# Patient Record
Sex: FEMALE | Race: WHITE | NOT HISPANIC OR LATINO | Employment: FULL TIME | URBAN - METROPOLITAN AREA
[De-identification: names, ages, dates, MRNs, and addresses within clinical notes are randomized per-mention and may not be internally consistent; named-entity substitution may affect disease eponyms.]

---

## 2024-05-15 PROBLEM — D69.6 THROMBOCYTOPENIA (HCC): Status: ACTIVE | Noted: 2024-05-15

## 2024-05-15 PROBLEM — Z71.6 ENCOUNTER FOR SMOKING CESSATION COUNSELING: Status: ACTIVE | Noted: 2024-05-15

## 2024-05-15 PROBLEM — N39.0 UTI (URINARY TRACT INFECTION): Status: ACTIVE | Noted: 2024-05-15

## 2024-05-15 PROBLEM — F10.90 ALCOHOL USE DISORDER: Status: ACTIVE | Noted: 2024-05-15

## 2024-05-15 PROBLEM — F15.90 AMPHETAMINE MISUSE: Status: ACTIVE | Noted: 2024-05-15

## 2024-05-15 PROBLEM — F15.10 METHAMPHETAMINE ABUSE (HCC): Status: ACTIVE | Noted: 2024-05-15

## 2024-05-15 PROBLEM — E83.42 HYPOMAGNESEMIA: Status: ACTIVE | Noted: 2024-05-15

## 2024-05-15 PROBLEM — R17 ELEVATED BILIRUBIN: Status: ACTIVE | Noted: 2024-05-15

## 2024-05-15 PROBLEM — I67.841: Status: ACTIVE | Noted: 2024-05-15

## 2024-05-15 PROBLEM — N30.00 ACUTE CYSTITIS WITHOUT HEMATURIA: Status: ACTIVE | Noted: 2024-05-15

## 2024-05-15 PROBLEM — F15.929 METHAMPHETAMINE INTOXICATION (HCC): Status: ACTIVE | Noted: 2024-05-15

## 2024-05-15 PROBLEM — G45.9 TIA (TRANSIENT ISCHEMIC ATTACK): Status: ACTIVE | Noted: 2024-05-15

## 2024-05-15 PROBLEM — I21.4 NSTEMI (NON-ST ELEVATED MYOCARDIAL INFARCTION) (HCC): Status: ACTIVE | Noted: 2024-05-15

## 2024-05-15 PROBLEM — I24.89 DEMAND ISCHEMIA (HCC): Status: ACTIVE | Noted: 2024-05-15

## 2024-05-16 ENCOUNTER — HOSPITAL ENCOUNTER (INPATIENT)
Facility: MEDICAL CENTER | Age: 49
LOS: 1 days | DRG: 281 | End: 2024-05-17
Attending: HOSPITALIST | Admitting: STUDENT IN AN ORGANIZED HEALTH CARE EDUCATION/TRAINING PROGRAM

## 2024-05-16 DIAGNOSIS — I25.118 CORONARY ARTERY DISEASE OF NATIVE HEART WITH STABLE ANGINA PECTORIS, UNSPECIFIED VESSEL OR LESION TYPE (HCC): ICD-10-CM

## 2024-05-16 PROBLEM — Z72.0 TOBACCO ABUSE: Status: ACTIVE | Noted: 2024-05-16

## 2024-05-16 PROBLEM — R07.9 CHEST PAIN: Status: ACTIVE | Noted: 2024-05-16

## 2024-05-16 LAB
APTT PPP: 66.7 SEC (ref 24.7–36)
INR PPP: 1.14 (ref 0.87–1.13)
PROTHROMBIN TIME: 14.8 SEC (ref 12–14.6)
TROPONIN T SERPL-MCNC: 18 NG/L (ref 6–19)
UFH PPP CHRO-ACNC: <0.1 IU/ML

## 2024-05-16 PROCEDURE — 700102 HCHG RX REV CODE 250 W/ 637 OVERRIDE(OP): Performed by: STUDENT IN AN ORGANIZED HEALTH CARE EDUCATION/TRAINING PROGRAM

## 2024-05-16 PROCEDURE — 85520 HEPARIN ASSAY: CPT

## 2024-05-16 PROCEDURE — 99406 BEHAV CHNG SMOKING 3-10 MIN: CPT | Performed by: STUDENT IN AN ORGANIZED HEALTH CARE EDUCATION/TRAINING PROGRAM

## 2024-05-16 PROCEDURE — 700111 HCHG RX REV CODE 636 W/ 250 OVERRIDE (IP): Performed by: STUDENT IN AN ORGANIZED HEALTH CARE EDUCATION/TRAINING PROGRAM

## 2024-05-16 PROCEDURE — A9270 NON-COVERED ITEM OR SERVICE: HCPCS | Performed by: STUDENT IN AN ORGANIZED HEALTH CARE EDUCATION/TRAINING PROGRAM

## 2024-05-16 PROCEDURE — 84484 ASSAY OF TROPONIN QUANT: CPT | Mod: 91

## 2024-05-16 PROCEDURE — 85730 THROMBOPLASTIN TIME PARTIAL: CPT | Mod: 91

## 2024-05-16 PROCEDURE — 99223 1ST HOSP IP/OBS HIGH 75: CPT | Mod: 25 | Performed by: STUDENT IN AN ORGANIZED HEALTH CARE EDUCATION/TRAINING PROGRAM

## 2024-05-16 PROCEDURE — 36415 COLL VENOUS BLD VENIPUNCTURE: CPT

## 2024-05-16 PROCEDURE — 85610 PROTHROMBIN TIME: CPT

## 2024-05-16 PROCEDURE — 770020 HCHG ROOM/CARE - TELE (206)

## 2024-05-16 RX ORDER — ACETAMINOPHEN 325 MG/1
650 TABLET ORAL EVERY 6 HOURS PRN
Status: DISCONTINUED | OUTPATIENT
Start: 2024-05-16 | End: 2024-05-17 | Stop reason: HOSPADM

## 2024-05-16 RX ORDER — BUPROPION HYDROCHLORIDE 150 MG/1
150 TABLET, EXTENDED RELEASE ORAL DAILY
Status: DISCONTINUED | OUTPATIENT
Start: 2024-05-17 | End: 2024-05-17 | Stop reason: HOSPADM

## 2024-05-16 RX ORDER — LORAZEPAM 0.5 MG/1
0.5 TABLET ORAL EVERY 4 HOURS PRN
Status: DISCONTINUED | OUTPATIENT
Start: 2024-05-16 | End: 2024-05-17

## 2024-05-16 RX ORDER — HEPARIN SODIUM 1000 [USP'U]/ML
2000 INJECTION, SOLUTION INTRAVENOUS; SUBCUTANEOUS PRN
Status: DISCONTINUED | OUTPATIENT
Start: 2024-05-16 | End: 2024-05-17 | Stop reason: HOSPADM

## 2024-05-16 RX ORDER — LORAZEPAM 2 MG/1
2 TABLET ORAL
Status: DISCONTINUED | OUTPATIENT
Start: 2024-05-16 | End: 2024-05-17

## 2024-05-16 RX ORDER — ASPIRIN 81 MG/1
81 TABLET, CHEWABLE ORAL DAILY
Status: DISCONTINUED | OUTPATIENT
Start: 2024-05-17 | End: 2024-05-17 | Stop reason: HOSPADM

## 2024-05-16 RX ORDER — BUPROPION HYDROCHLORIDE 150 MG/1
150 TABLET, EXTENDED RELEASE ORAL 2 TIMES DAILY
Status: DISCONTINUED | OUTPATIENT
Start: 2024-05-27 | End: 2024-05-17 | Stop reason: HOSPADM

## 2024-05-16 RX ORDER — LORAZEPAM 1 MG/1
1 TABLET ORAL EVERY 4 HOURS PRN
Status: DISCONTINUED | OUTPATIENT
Start: 2024-05-16 | End: 2024-05-17

## 2024-05-16 RX ORDER — HEPARIN SODIUM 1000 [USP'U]/ML
4000 INJECTION, SOLUTION INTRAVENOUS; SUBCUTANEOUS ONCE
Status: DISCONTINUED | OUTPATIENT
Start: 2024-05-16 | End: 2024-05-16

## 2024-05-16 RX ORDER — LORAZEPAM 2 MG/1
4 TABLET ORAL
Status: DISCONTINUED | OUTPATIENT
Start: 2024-05-16 | End: 2024-05-17

## 2024-05-16 RX ORDER — FOLIC ACID 1 MG/1
1 TABLET ORAL DAILY
Status: DISCONTINUED | OUTPATIENT
Start: 2024-05-17 | End: 2024-05-17 | Stop reason: HOSPADM

## 2024-05-16 RX ORDER — DIAZEPAM 10 MG/2ML
10 INJECTION, SOLUTION INTRAMUSCULAR; INTRAVENOUS
Status: DISCONTINUED | OUTPATIENT
Start: 2024-05-16 | End: 2024-05-17

## 2024-05-16 RX ORDER — HEPARIN SODIUM 5000 [USP'U]/100ML
0-30 INJECTION, SOLUTION INTRAVENOUS CONTINUOUS
Status: DISCONTINUED | OUTPATIENT
Start: 2024-05-16 | End: 2024-05-17 | Stop reason: HOSPADM

## 2024-05-16 RX ORDER — HYDRALAZINE HYDROCHLORIDE 20 MG/ML
10 INJECTION INTRAMUSCULAR; INTRAVENOUS EVERY 4 HOURS PRN
Status: DISCONTINUED | OUTPATIENT
Start: 2024-05-16 | End: 2024-05-17 | Stop reason: HOSPADM

## 2024-05-16 RX ORDER — GAUZE BANDAGE 2" X 2"
100 BANDAGE TOPICAL DAILY
Status: DISCONTINUED | OUTPATIENT
Start: 2024-05-17 | End: 2024-05-17 | Stop reason: HOSPADM

## 2024-05-16 RX ORDER — ATORVASTATIN CALCIUM 40 MG/1
40 TABLET, FILM COATED ORAL EVERY EVENING
Status: DISCONTINUED | OUTPATIENT
Start: 2024-05-16 | End: 2024-05-16

## 2024-05-16 RX ADMIN — HEPARIN SODIUM 22 UNITS/KG/HR: 5000 INJECTION, SOLUTION INTRAVENOUS at 19:58

## 2024-05-16 RX ADMIN — LORAZEPAM 0.5 MG: 0.5 TABLET ORAL at 20:34

## 2024-05-16 RX ADMIN — ACETAMINOPHEN 650 MG: 325 TABLET, FILM COATED ORAL at 22:33

## 2024-05-16 RX ADMIN — HEPARIN SODIUM 2000 UNITS: 1000 INJECTION INTRAVENOUS; SUBCUTANEOUS at 20:48

## 2024-05-16 ASSESSMENT — LIFESTYLE VARIABLES
VISUAL DISTURBANCES: NOT PRESENT
AUDITORY DISTURBANCES: NOT PRESENT
HEADACHE, FULLNESS IN HEAD: NOT PRESENT
TOTAL SCORE: 5
ORIENTATION AND CLOUDING OF SENSORIUM: ORIENTED AND CAN DO SERIAL ADDITIONS
NAUSEA AND VOMITING: NO NAUSEA AND NO VOMITING
PAROXYSMAL SWEATS: NO SWEAT VISIBLE
ANXIETY: MODERATELY ANXIOUS OR GUARDED, SO ANXIETY IS INFERRED
ANXIETY: MODERATELY ANXIOUS OR GUARDED, SO ANXIETY IS INFERRED
TREMOR: TREMOR NOT VISIBLE BUT CAN BE FELT, FINGERTIP TO FINGERTIP
ORIENTATION AND CLOUDING OF SENSORIUM: ORIENTED AND CAN DO SERIAL ADDITIONS
NAUSEA AND VOMITING: NO NAUSEA AND NO VOMITING
HEADACHE, FULLNESS IN HEAD: NOT PRESENT
TREMOR: TREMOR NOT VISIBLE BUT CAN BE FELT, FINGERTIP TO FINGERTIP
AUDITORY DISTURBANCES: NOT PRESENT
AGITATION: NORMAL ACTIVITY
PAROXYSMAL SWEATS: NO SWEAT VISIBLE
SUBSTANCE_ABUSE: 1
VISUAL DISTURBANCES: NOT PRESENT
AGITATION: SOMEWHAT MORE THAN NORMAL ACTIVITY
TOTAL SCORE: 6

## 2024-05-16 ASSESSMENT — ENCOUNTER SYMPTOMS
HALLUCINATIONS: 0
SPUTUM PRODUCTION: 0
DIZZINESS: 0
TINGLING: 0
FOCAL WEAKNESS: 0
NERVOUS/ANXIOUS: 0
SEIZURES: 0
NECK PAIN: 0
DOUBLE VISION: 0
COUGH: 0
ABDOMINAL PAIN: 0
FEVER: 0
SENSORY CHANGE: 0
ORTHOPNEA: 0
PHOTOPHOBIA: 0
EYE DISCHARGE: 0
VOMITING: 0
SINUS PAIN: 0
SHORTNESS OF BREATH: 0
HEADACHES: 0
NAUSEA: 0
BACK PAIN: 0
PALPITATIONS: 0
TREMORS: 0
EYE PAIN: 0
SPEECH CHANGE: 0
CHILLS: 0
DIARRHEA: 0
WEAKNESS: 0
INSOMNIA: 0
HEMOPTYSIS: 0
DEPRESSION: 0

## 2024-05-16 ASSESSMENT — PAIN DESCRIPTION - PAIN TYPE: TYPE: ACUTE PAIN

## 2024-05-16 ASSESSMENT — FIBROSIS 4 INDEX: FIB4 SCORE: 5.54

## 2024-05-16 NOTE — PROGRESS NOTES
Triage officer note    Patient seen at [facility] with chest pain.  History of methamphetamine abuse but no coronary artery disease.  Had an abnormal stress test.  Case discussed by the emergency room physician with cardiology here.  Cardiology here does not recommend cardiac cath but does think that cardiac CTA is indicated.  This is a study which they cannot do there.

## 2024-05-17 ENCOUNTER — PHARMACY VISIT (OUTPATIENT)
Dept: PHARMACY | Facility: MEDICAL CENTER | Age: 49
End: 2024-05-17
Payer: COMMERCIAL

## 2024-05-17 ENCOUNTER — APPOINTMENT (OUTPATIENT)
Dept: RADIOLOGY | Facility: MEDICAL CENTER | Age: 49
DRG: 281 | End: 2024-05-17
Attending: STUDENT IN AN ORGANIZED HEALTH CARE EDUCATION/TRAINING PROGRAM

## 2024-05-17 VITALS
OXYGEN SATURATION: 94 % | TEMPERATURE: 97.9 F | SYSTOLIC BLOOD PRESSURE: 95 MMHG | HEIGHT: 67 IN | HEART RATE: 79 BPM | DIASTOLIC BLOOD PRESSURE: 56 MMHG | RESPIRATION RATE: 22 BRPM | BODY MASS INDEX: 25.61 KG/M2 | WEIGHT: 163.14 LBS

## 2024-05-17 LAB
ALBUMIN SERPL BCP-MCNC: 2.8 G/DL (ref 3.2–4.9)
ALBUMIN/GLOB SERPL: 0.7 G/DL
ALP SERPL-CCNC: 209 U/L (ref 30–99)
ALT SERPL-CCNC: 43 U/L (ref 2–50)
ANION GAP SERPL CALC-SCNC: 9 MMOL/L (ref 7–16)
AST SERPL-CCNC: 81 U/L (ref 12–45)
BILIRUB SERPL-MCNC: 1.1 MG/DL (ref 0.1–1.5)
BUN SERPL-MCNC: 10 MG/DL (ref 8–22)
CALCIUM ALBUM COR SERPL-MCNC: 9 MG/DL (ref 8.5–10.5)
CALCIUM SERPL-MCNC: 8 MG/DL (ref 8.5–10.5)
CHLORIDE SERPL-SCNC: 108 MMOL/L (ref 96–112)
CO2 SERPL-SCNC: 20 MMOL/L (ref 20–33)
CREAT SERPL-MCNC: 0.4 MG/DL (ref 0.5–1.4)
ERYTHROCYTE [DISTWIDTH] IN BLOOD BY AUTOMATED COUNT: 42 FL (ref 35.9–50)
GFR SERPLBLD CREATININE-BSD FMLA CKD-EPI: 120 ML/MIN/1.73 M 2
GLOBULIN SER CALC-MCNC: 4 G/DL (ref 1.9–3.5)
GLUCOSE SERPL-MCNC: 107 MG/DL (ref 65–99)
HCT VFR BLD AUTO: 42.4 % (ref 37–47)
HGB BLD-MCNC: 14.3 G/DL (ref 12–16)
MAGNESIUM SERPL-MCNC: 1.8 MG/DL (ref 1.5–2.5)
MCH RBC QN AUTO: 29.3 PG (ref 27–33)
MCHC RBC AUTO-ENTMCNC: 33.7 G/DL (ref 32.2–35.5)
MCV RBC AUTO: 86.9 FL (ref 81.4–97.8)
PHOSPHATE SERPL-MCNC: 3.9 MG/DL (ref 2.5–4.5)
PLATELET # BLD AUTO: 105 K/UL (ref 164–446)
PMV BLD AUTO: 11.1 FL (ref 9–12.9)
POTASSIUM SERPL-SCNC: 4.1 MMOL/L (ref 3.6–5.5)
PROT SERPL-MCNC: 6.8 G/DL (ref 6–8.2)
RBC # BLD AUTO: 4.88 M/UL (ref 4.2–5.4)
SODIUM SERPL-SCNC: 137 MMOL/L (ref 135–145)
TROPONIN T SERPL-MCNC: 13 NG/L (ref 6–19)
UFH PPP CHRO-ACNC: 0.48 IU/ML
UFH PPP CHRO-ACNC: 0.51 IU/ML
UFH PPP CHRO-ACNC: 0.66 IU/ML
WBC # BLD AUTO: 8.1 K/UL (ref 4.8–10.8)

## 2024-05-17 PROCEDURE — 700111 HCHG RX REV CODE 636 W/ 250 OVERRIDE (IP): Performed by: STUDENT IN AN ORGANIZED HEALTH CARE EDUCATION/TRAINING PROGRAM

## 2024-05-17 PROCEDURE — 700102 HCHG RX REV CODE 250 W/ 637 OVERRIDE(OP): Performed by: STUDENT IN AN ORGANIZED HEALTH CARE EDUCATION/TRAINING PROGRAM

## 2024-05-17 PROCEDURE — 85027 COMPLETE CBC AUTOMATED: CPT

## 2024-05-17 PROCEDURE — 84100 ASSAY OF PHOSPHORUS: CPT

## 2024-05-17 PROCEDURE — 85520 HEPARIN ASSAY: CPT

## 2024-05-17 PROCEDURE — 80053 COMPREHEN METABOLIC PANEL: CPT

## 2024-05-17 PROCEDURE — 83735 ASSAY OF MAGNESIUM: CPT

## 2024-05-17 PROCEDURE — A9270 NON-COVERED ITEM OR SERVICE: HCPCS | Performed by: STUDENT IN AN ORGANIZED HEALTH CARE EDUCATION/TRAINING PROGRAM

## 2024-05-17 PROCEDURE — RXMED WILLOW AMBULATORY MEDICATION CHARGE: Performed by: STUDENT IN AN ORGANIZED HEALTH CARE EDUCATION/TRAINING PROGRAM

## 2024-05-17 PROCEDURE — 700117 HCHG RX CONTRAST REV CODE 255: Performed by: STUDENT IN AN ORGANIZED HEALTH CARE EDUCATION/TRAINING PROGRAM

## 2024-05-17 PROCEDURE — 36415 COLL VENOUS BLD VENIPUNCTURE: CPT

## 2024-05-17 PROCEDURE — 700101 HCHG RX REV CODE 250: Performed by: STUDENT IN AN ORGANIZED HEALTH CARE EDUCATION/TRAINING PROGRAM

## 2024-05-17 PROCEDURE — 84484 ASSAY OF TROPONIN QUANT: CPT

## 2024-05-17 PROCEDURE — 75574 CT ANGIO HRT W/3D IMAGE: CPT

## 2024-05-17 RX ORDER — LORAZEPAM 2 MG/ML
1 INJECTION INTRAMUSCULAR ONCE
Status: COMPLETED | OUTPATIENT
Start: 2024-05-17 | End: 2024-05-17

## 2024-05-17 RX ORDER — ATORVASTATIN CALCIUM 40 MG/1
40 TABLET, FILM COATED ORAL NIGHTLY
Qty: 30 TABLET | Refills: 1 | Status: SHIPPED | OUTPATIENT
Start: 2024-05-17

## 2024-05-17 RX ORDER — METOPROLOL TARTRATE 25 MG/1
25 TABLET, FILM COATED ORAL 2 TIMES DAILY
Status: DISCONTINUED | OUTPATIENT
Start: 2024-05-17 | End: 2024-05-17 | Stop reason: HOSPADM

## 2024-05-17 RX ORDER — NITROGLYCERIN 0.4 MG/1
0.4 TABLET SUBLINGUAL
Status: DISCONTINUED | OUTPATIENT
Start: 2024-05-17 | End: 2024-05-17 | Stop reason: HOSPADM

## 2024-05-17 RX ORDER — LORAZEPAM 0.5 MG/1
0.5 TABLET ORAL EVERY 4 HOURS
Status: DISCONTINUED | OUTPATIENT
Start: 2024-05-17 | End: 2024-05-17 | Stop reason: HOSPADM

## 2024-05-17 RX ADMIN — HEPARIN SODIUM 26 UNITS/KG/HR: 5000 INJECTION, SOLUTION INTRAVENOUS at 08:58

## 2024-05-17 RX ADMIN — BUPROPION HYDROCHLORIDE 150 MG: 150 TABLET, EXTENDED RELEASE ORAL at 05:05

## 2024-05-17 RX ADMIN — LORAZEPAM 0.5 MG: 0.5 TABLET ORAL at 12:07

## 2024-05-17 RX ADMIN — LORAZEPAM 0.5 MG: 0.5 TABLET ORAL at 05:05

## 2024-05-17 RX ADMIN — LORAZEPAM 1 MG: 2 INJECTION INTRAMUSCULAR; INTRAVENOUS at 09:20

## 2024-05-17 RX ADMIN — ASPIRIN 81 MG 81 MG: 81 TABLET ORAL at 05:05

## 2024-05-17 RX ADMIN — CEFTRIAXONE SODIUM 1000 MG: 10 INJECTION, POWDER, FOR SOLUTION INTRAVENOUS at 05:05

## 2024-05-17 RX ADMIN — FOLIC ACID 1 MG: 1 TABLET ORAL at 05:05

## 2024-05-17 RX ADMIN — Medication 100 MG: at 05:05

## 2024-05-17 RX ADMIN — METOPROLOL TARTRATE 25 MG: 25 TABLET, FILM COATED ORAL at 08:55

## 2024-05-17 RX ADMIN — IOHEXOL 60 ML: 350 INJECTION, SOLUTION INTRAVENOUS at 15:15

## 2024-05-17 RX ADMIN — THERA TABS 1 TABLET: TAB at 05:05

## 2024-05-17 RX ADMIN — LORAZEPAM 1 MG: 1 TABLET ORAL at 00:31

## 2024-05-17 RX ADMIN — NITROGLYCERIN 0.4 MG: 0.4 TABLET, ORALLY DISINTEGRATING SUBLINGUAL at 10:33

## 2024-05-17 SDOH — ECONOMIC STABILITY: TRANSPORTATION INSECURITY
IN THE PAST 12 MONTHS, HAS LACK OF RELIABLE TRANSPORTATION KEPT YOU FROM MEDICAL APPOINTMENTS, MEETINGS, WORK OR FROM GETTING THINGS NEEDED FOR DAILY LIVING?: NO

## 2024-05-17 SDOH — ECONOMIC STABILITY: TRANSPORTATION INSECURITY
IN THE PAST 12 MONTHS, HAS THE LACK OF TRANSPORTATION KEPT YOU FROM MEDICAL APPOINTMENTS OR FROM GETTING MEDICATIONS?: NO

## 2024-05-17 ASSESSMENT — LIFESTYLE VARIABLES
TREMOR: NO TREMOR
ORIENTATION AND CLOUDING OF SENSORIUM: ORIENTED AND CAN DO SERIAL ADDITIONS
HAVE PEOPLE ANNOYED YOU BY CRITICIZING YOUR DRINKING: NO
AUDITORY DISTURBANCES: NOT PRESENT
EVER FELT BAD OR GUILTY ABOUT YOUR DRINKING: NO
TOTAL SCORE: 5
AGITATION: *
PAROXYSMAL SWEATS: NO SWEAT VISIBLE
HAVE YOU EVER FELT YOU SHOULD CUT DOWN ON YOUR DRINKING: NO
ANXIETY: MODERATELY ANXIOUS OR GUARDED, SO ANXIETY IS INFERRED
TREMOR: NO TREMOR
TOTAL SCORE: 0
AGITATION: SOMEWHAT MORE THAN NORMAL ACTIVITY
VISUAL DISTURBANCES: NOT PRESENT
DOES PATIENT WANT TO STOP DRINKING: NO
TOTAL SCORE: 8
TOTAL SCORE: 0
TOTAL SCORE: 5
AUDITORY DISTURBANCES: NOT PRESENT
TREMOR: TREMOR NOT VISIBLE BUT CAN BE FELT, FINGERTIP TO FINGERTIP
VISUAL DISTURBANCES: NOT PRESENT
AVERAGE NUMBER OF DAYS PER WEEK YOU HAVE A DRINK CONTAINING ALCOHOL: 7
ON A TYPICAL DAY WHEN YOU DRINK ALCOHOL HOW MANY DRINKS DO YOU HAVE: 1
EVER HAD A DRINK FIRST THING IN THE MORNING TO STEADY YOUR NERVES TO GET RID OF A HANGOVER: NO
CONSUMPTION TOTAL: NEGATIVE
PAROXYSMAL SWEATS: NO SWEAT VISIBLE
HOW MANY TIMES IN THE PAST YEAR HAVE YOU HAD 5 OR MORE DRINKS IN A DAY: 0
NAUSEA AND VOMITING: NO NAUSEA AND NO VOMITING
AUDITORY DISTURBANCES: NOT PRESENT
ORIENTATION AND CLOUDING OF SENSORIUM: ORIENTED AND CAN DO SERIAL ADDITIONS
HEADACHE, FULLNESS IN HEAD: NOT PRESENT
HEADACHE, FULLNESS IN HEAD: NOT PRESENT
ANXIETY: MODERATELY ANXIOUS OR GUARDED, SO ANXIETY IS INFERRED
ORIENTATION AND CLOUDING OF SENSORIUM: ORIENTED AND CAN DO SERIAL ADDITIONS
HEADACHE, FULLNESS IN HEAD: NOT PRESENT
ALCOHOL_USE: YES
NAUSEA AND VOMITING: NO NAUSEA AND NO VOMITING
NAUSEA AND VOMITING: NO NAUSEA AND NO VOMITING
AGITATION: SOMEWHAT MORE THAN NORMAL ACTIVITY
ANXIETY: *
TOTAL SCORE: 0
PAROXYSMAL SWEATS: NO SWEAT VISIBLE
VISUAL DISTURBANCES: NOT PRESENT

## 2024-05-17 ASSESSMENT — PAIN DESCRIPTION - PAIN TYPE: TYPE: ACUTE PAIN

## 2024-05-17 ASSESSMENT — SOCIAL DETERMINANTS OF HEALTH (SDOH)
WITHIN THE LAST YEAR, HAVE YOU BEEN AFRAID OF YOUR PARTNER OR EX-PARTNER?: NO
WITHIN THE PAST 12 MONTHS, YOU WORRIED THAT YOUR FOOD WOULD RUN OUT BEFORE YOU GOT THE MONEY TO BUY MORE: SOMETIMES TRUE
WITHIN THE LAST YEAR, HAVE YOU BEEN HUMILIATED OR EMOTIONALLY ABUSED IN OTHER WAYS BY YOUR PARTNER OR EX-PARTNER?: NO
WITHIN THE PAST 12 MONTHS, THE FOOD YOU BOUGHT JUST DIDN'T LAST AND YOU DIDN'T HAVE MONEY TO GET MORE: SOMETIMES TRUE
WITHIN THE LAST YEAR, HAVE YOU BEEN KICKED, HIT, SLAPPED, OR OTHERWISE PHYSICALLY HURT BY YOUR PARTNER OR EX-PARTNER?: NO
IN THE PAST 12 MONTHS, HAS THE ELECTRIC, GAS, OIL, OR WATER COMPANY THREATENED TO SHUT OFF SERVICE IN YOUR HOME?: NO
WITHIN THE LAST YEAR, HAVE TO BEEN RAPED OR FORCED TO HAVE ANY KIND OF SEXUAL ACTIVITY BY YOUR PARTNER OR EX-PARTNER?: NO

## 2024-05-17 ASSESSMENT — PATIENT HEALTH QUESTIONNAIRE - PHQ9
1. LITTLE INTEREST OR PLEASURE IN DOING THINGS: NOT AT ALL
1. LITTLE INTEREST OR PLEASURE IN DOING THINGS: NOT AT ALL
SUM OF ALL RESPONSES TO PHQ9 QUESTIONS 1 AND 2: 0
SUM OF ALL RESPONSES TO PHQ9 QUESTIONS 1 AND 2: 0
2. FEELING DOWN, DEPRESSED, IRRITABLE, OR HOPELESS: NOT AT ALL

## 2024-05-17 ASSESSMENT — FIBROSIS 4 INDEX: FIB4 SCORE: 5.54

## 2024-05-17 NOTE — ASSESSMENT & PLAN NOTE
Patient is a presented outside facility complaining of back pain, fevers and chills with leukocytosis  Urinalysis suggestive of UTI  Started on IV Rocephin  Continue for total of 5 days

## 2024-05-17 NOTE — CARE PLAN
Problem: Knowledge Deficit - Standard  Goal: Patient and family/care givers will demonstrate understanding of plan of care, disease process/condition, diagnostic tests and medications  Outcome: Progressing     Problem: Optimal Care for Alcohol Withdrawal  Goal: Optimal Care for the alcohol withdrawal patient  Outcome: Progressing     Problem: Psychosocial  Goal: Patient's level of anxiety will decrease  Outcome: Progressing     Problem: Pain - Standard  Goal: Alleviation of pain or a reduction in pain to the patient’s comfort goal  Outcome: Progressing     Problem: Fall Risk  Goal: Patient will remain free from falls  Outcome: Progressing   The patient is Watcher - Medium risk of patient condition declining or worsening    Shift Goals  Clinical Goals: New admit, heparin gtt    Progress made toward(s) clinical / shift goals:  progressing    Patient is not progressing towards the following goals:

## 2024-05-17 NOTE — PROGRESS NOTES
4 Eyes Skin Assessment Completed by TONI Munson and LIDIA Montaño.    Head WDL  Ears WDL  Nose WDL  Mouth WDL  Neck WDL  Breast/Chest WDL  Shoulder Blades WDL  Spine WDL  (R) Arm/Elbow/Hand Redness and Blanching elbow  (L) Arm/Elbow/Hand Redness and Blanching elbow  Abdomen WDL  Groin WDL  Scrotum/Coccyx/Buttocks WDL  (R) Leg Scab  (L) Leg Scab  (R) Heel/Foot/Toe Redness and Blanching  (L) Heel/Foot/Toe Redness and Blanching          Devices In Places Tele Box, Blood Pressure Cuff, and Pulse Ox      Interventions In Place Pillows, Heels Loaded W/Pillows, and Pressure Redistribution Mattress    Possible Skin Injury No    Pictures Uploaded Into Epic N/A  Wound Consult Placed N/A  RN Wound Prevention Protocol Ordered No

## 2024-05-17 NOTE — DISCHARGE INSTRUCTIONS
Diagnosis:  Acute Coronary Syndrome (ACS) is a diagnosis that encompasses cardiac-related chest pain and heart attack. ACS occurs when the blood flow to the heart muscle is severely reduced or cut off completely due to a slow process called atherosclerosis.  Atherosclerosis is a disease in which the coronary arteries become narrow from a buildup of fat, cholesterol, and other substances that combine to form plaque. If the plaque breaks, a blood clot will form and block the blood flow to the heart muscle. This lack of blood flow can cause damage or death to the heart muscle which is called a heart attack or Myocardial Infarction (MI). There are two different types of MIs:  ST Elevation Myocardial Infarction or STEMI (the most severe type of heart attack) and Non-ST Elevation Myocardial Infarction or NSTEMI.    Treatment Plan:  Cardiac Diet  - Low fat, low salt, low cholesterol   Cardiac Rehab  - Your doctor has ordered you a referral to [facility].  Call [phone] to schedule an appointment.  Attend my follow-up appointment with my Cardiologist.  Take my medications as prescribed by my doctor  Exercise daily  Lower my bad cholesterol and raise my good cholesterol and Reduce stress    Medications:  Certain medications are used to treat ACS.  Remember to always take medications as prescribed and never stop talking medications unless told by your doctor.    You have been prescribed the following medicatons:    Statin - Statin  is used to lower cholesterol.

## 2024-05-17 NOTE — PROGRESS NOTES
Per pharmacist Jason Bello restart heparin per dosage from [place] @1700 u/hr; rounded to 22 u/kg/hr on Xa protocol

## 2024-05-17 NOTE — PROGRESS NOTES
Pt received from [place]. Tele monitor in place. VSS. Pt oriented to room. Educated on use of the call light. Pt demonstrated use of the call light. Discussed POC. All questions answered.

## 2024-05-17 NOTE — ASSESSMENT & PLAN NOTE
Her initial presentation and also facility showed some deficits in the upper extremities.  There is since resolved.  There is narrowing of the M1 concerning for RCVS  MRI showed no acute abnormalities.  No stroke.  There is concern for occult thalamic syndrome/reversible cerebral vasoconstriction syndrome poss related to methamphetamine use.  She was started on heparin drip due to her rising troponin level  Every 4 hours neurochecks

## 2024-05-17 NOTE — ASSESSMENT & PLAN NOTE
Patient with history of alcohol abuse.  She was started on CIWA protocol outside facility  Continue with CIWA protocol.  Patient need close hemodynamic monitoring  Continue with multivitamin, folic acid and thiamine  Counseled patient about cessation of

## 2024-05-17 NOTE — ASSESSMENT & PLAN NOTE
During her hospitalization at [place], she initially had uptrending troponin levels.  They are now currently downtrending.  EKG did not show any acute ST-T wave changes.  Was not having any active chest pain.  She does have a history of amphetamine abuse and alcohol abuse.  It was thought that this could be demand ischemia in setting of persistent sinus tachycardia  She was also started on heparin drip.  Continue with heparin drip  She had echocardiogram done, which showed an ejection fraction of 55%.  Stress test was obtained and was found to be abnormal  Case was discussed with hospitalist and cardiologist on-call, who recommended the patient be transferred here to [facility] for coronary CTA.  Patient is not a candidate for catheterization due to her methamphetamine abuse  Continue with cardiac monitoring

## 2024-05-17 NOTE — ASSESSMENT & PLAN NOTE
Patient is a urine tox positive for methamphetamine.  Counseled patient on methamphetamine cessation  Her methamphetamine abuse is likely causing the patient's increasing troponin levels.  Also her possible neurological symptoms were seen before

## 2024-05-17 NOTE — H&P
Hospital Medicine History & Physical Note    Date of Service  5/16/2024    Primary Care Physician  KATALINA Ghosh.    Consultants  cardiology    Specialist Names: Dr. Presley     Code Status  Full Code    Chief Complaint  No chief complaint on file.      History of Presenting Illness  Guillermina Orozco is a 48 y.o. female who presented 5/16/2024 as a transfer from [facility] for further cardiac workup.  Patient initially been admitted to outside facility due to elevated troponin and UTI.  Initially, patient was to be discharged from the emergency department, but she had acute onset neurological symptoms, where she was unable to move her right upper extremity.  She had significant right upper extremity weakness on physical exam.  CT angio of the head and neck showed moderate stenosis of the M1 segment on the right side and decreased flow throughout the right MCA, possibly due to a blood clot.  Her symptoms had resolved and went back to baseline.  While in the Emergency Department patient was continuously tachycardic.  Troponins continue to uptrend, patient denied any chest pain.  EKG did not show any significant ST segment elevation or depression.  During her hospital course, patient was started on a heparin drip.  Echocardiogram showed ejection fraction of 55%.  She had a stress test done which was abnormal.  The case was discussed with our cardiologist, who recommended the patient be transferred around [facility] for coronary CTA.     I discussed the plan of care with patient.    Review of Systems  Review of Systems   Constitutional:  Negative for chills and fever.   HENT:  Negative for congestion, ear discharge, ear pain, nosebleeds and sinus pain.    Eyes:  Negative for double vision, photophobia, pain and discharge.   Respiratory:  Negative for cough, hemoptysis, sputum production and shortness of breath.    Cardiovascular:  Negative for chest pain, palpitations and orthopnea.    Gastrointestinal:  Negative for abdominal pain, diarrhea, nausea and vomiting.   Genitourinary:  Negative for dysuria, frequency, hematuria and urgency.   Musculoskeletal:  Negative for back pain and neck pain.   Neurological:  Negative for dizziness, tingling, tremors, sensory change, speech change, focal weakness, seizures, weakness and headaches.   Psychiatric/Behavioral:  Positive for substance abuse. Negative for depression, hallucinations and suicidal ideas. The patient is not nervous/anxious and does not have insomnia.        Past Medical History   has a past medical history of Acute pain, Arthritis, At risk for sleep apnea, Breath shortness, Dental disorder, Hepatitis C (1990), Hypertension, and Snoring.    Surgical History   has no past surgical history on file.     Family History  family history is not on file.   Family history reviewed with patient. There is no family history that is pertinent to the chief complaint.     Social History   reports that she has been smoking cigarettes. She started smoking about 37 years ago. She has a 18.7 pack-year smoking history. She does not have any smokeless tobacco history on file. She reports current alcohol use of about 1.2 oz of alcohol per week. She reports that she does not currently use drugs.    Allergies  Allergies   Allergen Reactions    Codeine Rash     rash       Medications  Prior to Admission Medications   Prescriptions Last Dose Informant Patient Reported? Taking?   HYDROcodone-acetaminophen (NORCO) 5-325 MG Tab per tablet   No No   Sig: Take 1 Tablet by mouth every four hours as needed (pain) for up to 5 days.   acetaminophen (TYLENOL) 500 MG Tab   Yes No   Sig: Take 500-1,000 mg by mouth every 6 hours as needed for Moderate Pain.   aspirin (ASA) 81 MG Chew Tab chewable tablet   No No   Sig: Chew 1 Tablet every day.   buPROPion SR (WELLBUTRIN-SR) 150 MG TABLET SR 12 HR sustained-release tablet   Yes No   Sig: Take 1 Tablet by mouth every day, THEN 1  Tablet 2 times a day.   heparin 50 Units/mL Solution   Yes No   Sig: Infuse 800 Units/hr into a venous catheter continuous.   heparin 5000 UNIT/ML Solution   Yes No   Sig: Infuse 0.5 mL into a venous catheter as needed (Rebolus Dose as per sliding scale).   levoFLOXacin (LEVAQUIN) 750 MG tablet   No No   Sig: Take 1 Tablet by mouth every day for 7 days.   ondansetron (ZOFRAN ODT) 4 MG TABLET DISPERSIBLE   No No   Sig: Take 1 Tablet by mouth every 6 hours for 5 days.   thiamine (THIAMINE) 100 MG tablet   No No   Sig: Take 1 Tablet by mouth every day.      Facility-Administered Medications: None       Physical Exam  Temp:  [36.2 °C (97.2 °F)-37.2 °C (99 °F)] 36.4 °C (97.5 °F)  Pulse:  [] 99  Resp:  [19-30] 20  BP: (106-127)/(70-76) 111/71  SpO2:  [92 %-97 %] 94 %                          Physical Exam  Constitutional:       General: She is not in acute distress.     Appearance: Normal appearance. She is normal weight. She is not ill-appearing, toxic-appearing or diaphoretic.   HENT:      Head: Normocephalic and atraumatic.      Nose: Nose normal.      Mouth/Throat:      Mouth: Mucous membranes are moist.   Eyes:      Extraocular Movements: Extraocular movements intact.      Pupils: Pupils are equal, round, and reactive to light.   Cardiovascular:      Rate and Rhythm: Normal rate and regular rhythm.      Pulses: Normal pulses.      Heart sounds: Normal heart sounds. No murmur heard.     No friction rub. No gallop.   Pulmonary:      Effort: Pulmonary effort is normal. No respiratory distress.      Breath sounds: No stridor. No wheezing, rhonchi or rales.   Chest:      Chest wall: No tenderness.   Abdominal:      General: Abdomen is flat. There is no distension.      Palpations: Abdomen is soft. There is no mass.      Tenderness: There is no abdominal tenderness. There is no guarding or rebound.      Hernia: No hernia is present.   Musculoskeletal:         General: No swelling, tenderness, deformity or signs of  "injury.      Right lower leg: No edema.      Left lower leg: No edema.      Comments:      Skin:     General: Skin is warm and dry.      Capillary Refill: Capillary refill takes less than 2 seconds.      Coloration: Skin is not jaundiced or pale.      Findings: No bruising, erythema, lesion or rash.   Neurological:      General: No focal deficit present.      Mental Status: She is alert and oriented to person, place, and time. Mental status is at baseline.      Cranial Nerves: No cranial nerve deficit.      Sensory: No sensory deficit.      Motor: No weakness.      Coordination: Coordination normal.   Psychiatric:         Mood and Affect: Mood normal.         Behavior: Behavior normal.         Laboratory:  Recent Labs     05/14/24  1910 05/15/24  0545 05/16/24 0425   WBC 11.0* 7.9 8.1   RBC 5.00 4.52 4.36   HEMOGLOBIN 14.8 13.2 12.8*   HEMATOCRIT 44.0 42.0 38.6*   MCV 88.0 92.9 88.5   MCH 29.6 29.2 29.4   MCHC 33.6 31.4* 33.2   RDW 13.7 13.9 13.4   PLATELETCT 107* 93* 96*   MPV 11.9* 11.7* 11.1*     Recent Labs     05/14/24  1910 05/15/24  0545 05/16/24  0425   SODIUM 139 139 136   POTASSIUM 3.5 3.5 3.4*   CHLORIDE 106 109* 106   CO2 24 22 24   GLUCOSE 121* 107* 109*   BUN 18 17 12   CREATININE 0.8 0.6 0.6   CALCIUM 8.7 8.0* 7.8*     Recent Labs     05/14/24  1910 05/15/24  0545 05/16/24  0425   ALTSGPT 62 53 51   ASTSGOT 79* 70* 76*   ALKPHOSPHAT 276* 228* 209*   TBILIRUBIN 1.6* 1.2* 1.6*   GLUCOSE 121* 107* 109*     Recent Labs     05/14/24  1930 05/15/24  0821 05/15/24  1436 05/15/24  2050 05/16/24  0425 05/16/24  1515   APTT 29.4 30.1   < > 39.7* 41.0* 29.5   INR 1.20 1.18  --   --   --   --     < > = values in this interval not displayed.     Recent Labs     05/14/24  1910   NTPROBNP 240*     Recent Labs     05/15/24  0545   TRIGLYCERIDE 87   HDL 20.0*   LDL 86     No results for input(s): \"TROPONINT\" in the last 72 hours.    Imaging:  CT-CTA HEART W/3D IMAGE    (Results Pending)       X-Ray:  I have " personally reviewed the images and compared with prior images.  EKG:  I have personally reviewed the images and compared with prior images.    Assessment/Plan:  Justification for Admission Status  I anticipate this patient will require at least two midnights for appropriate medical management, necessitating inpatient admission because NSTEMI    Patient will need a Telemetry bed on MEDICAL service .  The need is secondary to NSTEMI.    * NSTEMI (non-ST elevated myocardial infarction) (HCC)- (present on admission)  Assessment & Plan  During her hospitalization at [place], she initially had uptrending troponin levels.  They are now currently downtrending.  EKG did not show any acute ST-T wave changes.  Was not having any active chest pain.  She does have a history of amphetamine abuse and alcohol abuse.  It was thought that this could be demand ischemia in setting of persistent sinus tachycardia  She was also started on heparin drip.  Continue with heparin drip  She had echocardiogram done, which showed an ejection fraction of 55%.  Stress test was obtained and was found to be abnormal  Case was discussed with hospitalist and cardiologist on-call, who recommended the patient be transferred here to [facility] for coronary CTA.  Patient is not a candidate for catheterization due to her methamphetamine abuse  Continue with cardiac monitoring      Tobacco abuse  Assessment & Plan  Patient smokes < 1PPD.  Nicotine patch and/or gum offered.   I discussed cessation with patient including starting on nicotine patch and/or gum on discharge.  I also discussed medications to help with cessation with patient including Wellbutrin and Chantix, offered .  Smoking cessation discussed with patient for 4 minutes.      Methamphetamine intoxication (HCC)- (present on admission)  Assessment & Plan  Patient is a urine tox positive for methamphetamine.  Counseled patient on methamphetamine cessation  Her  methamphetamine abuse is likely causing the patient's increasing troponin levels.  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  There is since resolved.  There is narrowing of the M1 concerning for RCVS  MRI showed no acute abnormalities.  No stroke.  There is concern for occult thalamic syndrome/reversible cerebral vasoconstriction syndrome poss related to methamphetamine use.  She was started on heparin drip due to her rising troponin level  Every 4 hours neurochecks    Alcohol use disorder- (present on admission)  Assessment & Plan  Patient with history of alcohol abuse.  She was started on CIWA protocol outside facility  Continue with CIWA protocol.  Patient need close hemodynamic monitoring  Continue with multivitamin, folic acid and thiamine  Counseled patient about cessation of    TIA (transient ischemic attack)- (present on admission)  Assessment & Plan  At outside facility, was noted the patient had weakness of the right upper extremity while she was in the Emergency Department.  CT was positive for moderate stenosis of the M1 segment of the right MCA increase with throughout the right MCA possible due to clot.  Symptoms are completely resolved.  He was this is likely a TIA from vascular distribution  There is also suspected that this could be due to coughing syndrome related to methamphetamine abuse  MRI of the brain with contrast was obtained without evidence of stroke  Continue with telemetry monitoring  Neurochecks every 4 hours      Acute cystitis without hematuria- (present on admission)  Assessment & Plan  Patient is a presented outside facility complaining of back pain, fevers and chills with leukocytosis  Urinalysis suggestive of UTI  Started on IV Rocephin  Continue for total of 5 days        VTE prophylaxis: therapeutic  anticoagulation with heparin gtt

## 2024-05-17 NOTE — ASSESSMENT & PLAN NOTE
At outside facility, was noted the patient had weakness of the right upper extremity while she was in the Emergency Department.  CT was positive for moderate stenosis of the M1 segment of the right MCA increase with throughout the right MCA possible due to clot.  Symptoms are completely resolved.  He was this is likely a TIA from vascular distribution  There is also suspected that this could be due to coughing syndrome related to methamphetamine abuse  MRI of the brain with contrast was obtained without evidence of stroke  Continue with telemetry monitoring  Neurochecks every 4 hours

## 2024-05-17 NOTE — CARE PLAN
Problem: Knowledge Deficit - Standard  Goal: Patient and family/care givers will demonstrate understanding of plan of care, disease process/condition, diagnostic tests and medications  Outcome: Progressing     Problem: Lifestyle Changes  Goal: Patient's ability to identify lifestyle changes and available resources to help reduce recurrence of condition will improve  Outcome: Progressing     Problem: Pain - Standard  Goal: Alleviation of pain or a reduction in pain to the patient’s comfort goal  Outcome: Progressing   The patient is Stable - Low risk of patient condition declining or worsening    Shift Goals  Clinical Goals: lower HR  Patient Goals: go home    Progress made toward(s) clinical / shift goals:  Plan of care discussed with patient.     Patient is not progressing towards the following goals:

## 2024-05-17 NOTE — ASSESSMENT & PLAN NOTE
Patient smokes < 1PPD.  Nicotine patch and/or gum offered.   I discussed cessation with patient including starting on nicotine patch and/or gum on discharge.  I also discussed medications to help with cessation with patient including Wellbutrin and Chantix, offered .  Smoking cessation discussed with patient for 4 minutes.

## 2024-05-23 ENCOUNTER — HOSPITAL ENCOUNTER (EMERGENCY)
Facility: MEDICAL CENTER | Age: 49
End: 2024-05-23

## 2024-05-23 VITALS
WEIGHT: 162.26 LBS | TEMPERATURE: 100.8 F | HEIGHT: 67 IN | HEART RATE: 124 BPM | OXYGEN SATURATION: 95 % | SYSTOLIC BLOOD PRESSURE: 125 MMHG | BODY MASS INDEX: 25.47 KG/M2 | DIASTOLIC BLOOD PRESSURE: 77 MMHG | RESPIRATION RATE: 20 BRPM

## 2024-05-23 LAB
ALBUMIN SERPL BCP-MCNC: 2.9 G/DL (ref 3.2–4.9)
ALBUMIN/GLOB SERPL: 0.6 G/DL
ALP SERPL-CCNC: 232 U/L (ref 30–99)
ALT SERPL-CCNC: 64 U/L (ref 2–50)
ANION GAP SERPL CALC-SCNC: 12 MMOL/L (ref 7–16)
AST SERPL-CCNC: 107 U/L (ref 12–45)
BASOPHILS # BLD AUTO: 0.4 % (ref 0–1.8)
BASOPHILS # BLD: 0.04 K/UL (ref 0–0.12)
BILIRUB SERPL-MCNC: 0.7 MG/DL (ref 0.1–1.5)
BUN SERPL-MCNC: 13 MG/DL (ref 8–22)
CALCIUM ALBUM COR SERPL-MCNC: 9.4 MG/DL (ref 8.5–10.5)
CALCIUM SERPL-MCNC: 8.5 MG/DL (ref 8.5–10.5)
CHLORIDE SERPL-SCNC: 105 MMOL/L (ref 96–112)
CO2 SERPL-SCNC: 20 MMOL/L (ref 20–33)
CREAT SERPL-MCNC: 0.72 MG/DL (ref 0.5–1.4)
EKG IMPRESSION: NORMAL
EOSINOPHIL # BLD AUTO: 0.36 K/UL (ref 0–0.51)
EOSINOPHIL NFR BLD: 3.7 % (ref 0–6.9)
ERYTHROCYTE [DISTWIDTH] IN BLOOD BY AUTOMATED COUNT: 41.2 FL (ref 35.9–50)
GFR SERPLBLD CREATININE-BSD FMLA CKD-EPI: 101 ML/MIN/1.73 M 2
GLOBULIN SER CALC-MCNC: 4.7 G/DL (ref 1.9–3.5)
GLUCOSE SERPL-MCNC: 103 MG/DL (ref 65–99)
HCG SERPL QL: NEGATIVE
HCT VFR BLD AUTO: 46.2 % (ref 37–47)
HGB BLD-MCNC: 15.8 G/DL (ref 12–16)
IMM GRANULOCYTES # BLD AUTO: 0.02 K/UL (ref 0–0.11)
IMM GRANULOCYTES NFR BLD AUTO: 0.2 % (ref 0–0.9)
LYMPHOCYTES # BLD AUTO: 3.09 K/UL (ref 1–4.8)
LYMPHOCYTES NFR BLD: 32.1 % (ref 22–41)
MCH RBC QN AUTO: 29.2 PG (ref 27–33)
MCHC RBC AUTO-ENTMCNC: 34.2 G/DL (ref 32.2–35.5)
MCV RBC AUTO: 85.4 FL (ref 81.4–97.8)
MONOCYTES # BLD AUTO: 0.84 K/UL (ref 0–0.85)
MONOCYTES NFR BLD AUTO: 8.7 % (ref 0–13.4)
NEUTROPHILS # BLD AUTO: 5.28 K/UL (ref 1.82–7.42)
NEUTROPHILS NFR BLD: 54.9 % (ref 44–72)
NRBC # BLD AUTO: 0 K/UL
NRBC BLD-RTO: 0 /100 WBC (ref 0–0.2)
PLATELET # BLD AUTO: 126 K/UL (ref 164–446)
PMV BLD AUTO: 11.6 FL (ref 9–12.9)
POTASSIUM SERPL-SCNC: 4.1 MMOL/L (ref 3.6–5.5)
PROT SERPL-MCNC: 7.6 G/DL (ref 6–8.2)
RBC # BLD AUTO: 5.41 M/UL (ref 4.2–5.4)
SODIUM SERPL-SCNC: 137 MMOL/L (ref 135–145)
TROPONIN T SERPL-MCNC: 13 NG/L (ref 6–19)
WBC # BLD AUTO: 9.6 K/UL (ref 4.8–10.8)

## 2024-05-23 ASSESSMENT — FIBROSIS 4 INDEX: FIB4 SCORE: 4.9

## 2024-05-24 NOTE — ED NOTES
Called for patient in lobby with no response.  Lobby bathroom checked.  Will recheck in 15 minutes.

## 2024-05-24 NOTE — ED TRIAGE NOTES
"Chief Complaint   Patient presents with    Chest Pain     Seen earlier this month and admitted for an NSTEMI     Patient ambulatory to triage for the above complaint. Patient states that she has had a full cardiac work up earlier this month after being diagnosed with an NSTEMI. Patient presents tonight for chest pain and states that it feels like a vice squeezing her chest. Patient has associated SOB. Patient states that the last time she used meth was 5/15/2024. Patient was also recently prescribed anxiety medications.    Protocol ordered and EKG complete in triage.     Pt is alert and oriented, speaking in full sentences, follows commands and responds appropriately to questions. Resp are even and unlabored.      Pt placed outside phlebotomy. Pt educated on triage process. Pt encouraged to alert staff for any changes.     Patient and staff wearing appropriate PPE.    /77   Pulse (!) 124   Temp (!) 38.2 °C (100.8 °F) (Oral)   Resp 20   Ht 1.702 m (5' 7\")   Wt 73.6 kg (162 lb 4.1 oz)   SpO2 95%      "

## 2024-05-24 NOTE — ED NOTES
Attempted to call patient back from the lobby. Patient was not present when called. Checked phlebotomy area, bathroom, HOMER area, and family room. Patient was not present when called.

## 2024-05-24 NOTE — ED NOTES
Spoke with triage lobby staff and patient was not present with them, patient was not present on 3rd call attempt. Triage staff state they believe she left without signing an AMA form and without notifying staff.

## 2024-09-20 ENCOUNTER — APPOINTMENT (OUTPATIENT)
Dept: RADIOLOGY | Facility: MEDICAL CENTER | Age: 49
DRG: 432 | End: 2024-09-20
Attending: EMERGENCY MEDICINE

## 2024-09-20 ENCOUNTER — HOSPITAL ENCOUNTER (INPATIENT)
Facility: MEDICAL CENTER | Age: 49
LOS: 1 days | DRG: 432 | End: 2024-09-21
Attending: EMERGENCY MEDICINE | Admitting: STUDENT IN AN ORGANIZED HEALTH CARE EDUCATION/TRAINING PROGRAM

## 2024-09-20 DIAGNOSIS — K92.2 GASTROINTESTINAL HEMORRHAGE, UNSPECIFIED GASTROINTESTINAL HEMORRHAGE TYPE: ICD-10-CM

## 2024-09-20 PROBLEM — I85.11 ESOPHAGEAL VARICES WITH BLEEDING IN DISEASES CLASSIFIED ELSEWHERE (HCC): Status: ACTIVE | Noted: 2024-09-20

## 2024-09-20 PROBLEM — E78.5 DYSLIPIDEMIA: Status: ACTIVE | Noted: 2024-09-20

## 2024-09-20 PROBLEM — K70.30 ALCOHOLIC CIRRHOSIS OF LIVER WITHOUT ASCITES (HCC): Status: ACTIVE | Noted: 2024-09-20

## 2024-09-20 PROBLEM — K70.31 ALCOHOLIC CIRRHOSIS OF LIVER WITH ASCITES (HCC): Status: ACTIVE | Noted: 2024-09-20

## 2024-09-20 PROBLEM — K92.1 MELENA: Status: ACTIVE | Noted: 2024-09-20

## 2024-09-20 PROBLEM — F10.930 ALCOHOL WITHDRAWAL SYNDROME WITHOUT COMPLICATION (HCC): Status: ACTIVE | Noted: 2024-09-20

## 2024-09-20 PROBLEM — N83.201 RIGHT OVARIAN CYST: Status: ACTIVE | Noted: 2024-09-20

## 2024-09-20 LAB
ABO GROUP BLD: NORMAL
ALBUMIN SERPL BCP-MCNC: 3.3 G/DL (ref 3.2–4.9)
ALBUMIN/GLOB SERPL: 0.9 G/DL
ALP SERPL-CCNC: 179 U/L (ref 30–99)
ALT SERPL-CCNC: 46 U/L (ref 2–50)
ANION GAP SERPL CALC-SCNC: 11 MMOL/L (ref 7–16)
APTT PPP: 31.6 SEC (ref 24.7–36)
AST SERPL-CCNC: 95 U/L (ref 12–45)
BASOPHILS # BLD AUTO: 0.2 % (ref 0–1.8)
BASOPHILS # BLD: 0.02 K/UL (ref 0–0.12)
BILIRUB SERPL-MCNC: 2 MG/DL (ref 0.1–1.5)
BLD GP AB SCN SERPL QL: NORMAL
BUN SERPL-MCNC: 13 MG/DL (ref 8–22)
CALCIUM ALBUM COR SERPL-MCNC: 9.2 MG/DL (ref 8.5–10.5)
CALCIUM SERPL-MCNC: 8.6 MG/DL (ref 8.5–10.5)
CHLORIDE SERPL-SCNC: 102 MMOL/L (ref 96–112)
CO2 SERPL-SCNC: 23 MMOL/L (ref 20–33)
CREAT SERPL-MCNC: 0.45 MG/DL (ref 0.5–1.4)
EKG IMPRESSION: NORMAL
EOSINOPHIL # BLD AUTO: 0.07 K/UL (ref 0–0.51)
EOSINOPHIL NFR BLD: 0.8 % (ref 0–6.9)
ERYTHROCYTE [DISTWIDTH] IN BLOOD BY AUTOMATED COUNT: 42 FL (ref 35.9–50)
GFR SERPLBLD CREATININE-BSD FMLA CKD-EPI: 116 ML/MIN/1.73 M 2
GLOBULIN SER CALC-MCNC: 3.8 G/DL (ref 1.9–3.5)
GLUCOSE SERPL-MCNC: 101 MG/DL (ref 65–99)
HCT VFR BLD AUTO: 37.8 % (ref 37–47)
HGB BLD-MCNC: 12.4 G/DL (ref 12–16)
IMM GRANULOCYTES # BLD AUTO: 0.03 K/UL (ref 0–0.11)
IMM GRANULOCYTES NFR BLD AUTO: 0.3 % (ref 0–0.9)
INR PPP: 1.31 (ref 0.87–1.13)
LIPASE SERPL-CCNC: 51 U/L (ref 11–82)
LYMPHOCYTES # BLD AUTO: 2.19 K/UL (ref 1–4.8)
LYMPHOCYTES NFR BLD: 25.1 % (ref 22–41)
MCH RBC QN AUTO: 29.4 PG (ref 27–33)
MCHC RBC AUTO-ENTMCNC: 32.8 G/DL (ref 32.2–35.5)
MCV RBC AUTO: 89.6 FL (ref 81.4–97.8)
MONOCYTES # BLD AUTO: 0.82 K/UL (ref 0–0.85)
MONOCYTES NFR BLD AUTO: 9.4 % (ref 0–13.4)
NEUTROPHILS # BLD AUTO: 5.6 K/UL (ref 1.82–7.42)
NEUTROPHILS NFR BLD: 64.2 % (ref 44–72)
NRBC # BLD AUTO: 0 K/UL
NRBC BLD-RTO: 0 /100 WBC (ref 0–0.2)
PLATELET # BLD AUTO: 92 K/UL (ref 164–446)
PLATELET BLD QL SMEAR: NORMAL
PLATELETS.RETICULATED NFR BLD AUTO: 4.9 % (ref 0.6–13.1)
PMV BLD AUTO: 11.9 FL (ref 9–12.9)
POTASSIUM SERPL-SCNC: 3.9 MMOL/L (ref 3.6–5.5)
PROT SERPL-MCNC: 7.1 G/DL (ref 6–8.2)
PROTHROMBIN TIME: 16.3 SEC (ref 12–14.6)
RBC # BLD AUTO: 4.22 M/UL (ref 4.2–5.4)
RH BLD: NORMAL
SODIUM SERPL-SCNC: 136 MMOL/L (ref 135–145)
WBC # BLD AUTO: 8.7 K/UL (ref 4.8–10.8)

## 2024-09-20 PROCEDURE — 36415 COLL VENOUS BLD VENIPUNCTURE: CPT

## 2024-09-20 PROCEDURE — 700105 HCHG RX REV CODE 258: Performed by: EMERGENCY MEDICINE

## 2024-09-20 PROCEDURE — 71045 X-RAY EXAM CHEST 1 VIEW: CPT

## 2024-09-20 PROCEDURE — 99254 IP/OBS CNSLTJ NEW/EST MOD 60: CPT | Performed by: INTERNAL MEDICINE

## 2024-09-20 PROCEDURE — 99406 BEHAV CHNG SMOKING 3-10 MIN: CPT | Performed by: STUDENT IN AN ORGANIZED HEALTH CARE EDUCATION/TRAINING PROGRAM

## 2024-09-20 PROCEDURE — 85025 COMPLETE CBC W/AUTO DIFF WBC: CPT

## 2024-09-20 PROCEDURE — 700111 HCHG RX REV CODE 636 W/ 250 OVERRIDE (IP): Mod: JZ | Performed by: EMERGENCY MEDICINE

## 2024-09-20 PROCEDURE — 86900 BLOOD TYPING SEROLOGIC ABO: CPT

## 2024-09-20 PROCEDURE — 85610 PROTHROMBIN TIME: CPT

## 2024-09-20 PROCEDURE — 80053 COMPREHEN METABOLIC PANEL: CPT

## 2024-09-20 PROCEDURE — 83690 ASSAY OF LIPASE: CPT

## 2024-09-20 PROCEDURE — 86901 BLOOD TYPING SEROLOGIC RH(D): CPT

## 2024-09-20 PROCEDURE — 96365 THER/PROPH/DIAG IV INF INIT: CPT

## 2024-09-20 PROCEDURE — 99291 CRITICAL CARE FIRST HOUR: CPT | Mod: 25 | Performed by: STUDENT IN AN ORGANIZED HEALTH CARE EDUCATION/TRAINING PROGRAM

## 2024-09-20 PROCEDURE — 86850 RBC ANTIBODY SCREEN: CPT

## 2024-09-20 PROCEDURE — 85730 THROMBOPLASTIN TIME PARTIAL: CPT

## 2024-09-20 PROCEDURE — 770020 HCHG ROOM/CARE - TELE (206)

## 2024-09-20 PROCEDURE — 99285 EMERGENCY DEPT VISIT HI MDM: CPT

## 2024-09-20 PROCEDURE — 96375 TX/PRO/DX INJ NEW DRUG ADDON: CPT

## 2024-09-20 PROCEDURE — 99406 BEHAV CHNG SMOKING 3-10 MIN: CPT

## 2024-09-20 PROCEDURE — 85055 RETICULATED PLATELET ASSAY: CPT

## 2024-09-20 PROCEDURE — 93005 ELECTROCARDIOGRAM TRACING: CPT | Performed by: EMERGENCY MEDICINE

## 2024-09-20 RX ORDER — FOLIC ACID 1 MG/1
1 TABLET ORAL DAILY
Status: DISCONTINUED | OUTPATIENT
Start: 2024-09-21 | End: 2024-09-21 | Stop reason: HOSPADM

## 2024-09-20 RX ORDER — LORAZEPAM 2 MG/ML
2 INJECTION INTRAMUSCULAR
Status: DISCONTINUED | OUTPATIENT
Start: 2024-09-20 | End: 2024-09-21 | Stop reason: HOSPADM

## 2024-09-20 RX ORDER — NICOTINE 21 MG/24HR
14 PATCH, TRANSDERMAL 24 HOURS TRANSDERMAL
Status: DISCONTINUED | OUTPATIENT
Start: 2024-09-21 | End: 2024-09-21 | Stop reason: HOSPADM

## 2024-09-20 RX ORDER — PANTOPRAZOLE SODIUM 40 MG/10ML
80 INJECTION, POWDER, LYOPHILIZED, FOR SOLUTION INTRAVENOUS ONCE
Status: COMPLETED | OUTPATIENT
Start: 2024-09-20 | End: 2024-09-20

## 2024-09-20 RX ORDER — LORAZEPAM 2 MG/1
2 TABLET ORAL
Status: DISCONTINUED | OUTPATIENT
Start: 2024-09-20 | End: 2024-09-21 | Stop reason: HOSPADM

## 2024-09-20 RX ORDER — GAUZE BANDAGE 2" X 2"
100 BANDAGE TOPICAL DAILY
Status: DISCONTINUED | OUTPATIENT
Start: 2024-09-21 | End: 2024-09-21 | Stop reason: HOSPADM

## 2024-09-20 RX ORDER — LORAZEPAM 2 MG/ML
1 INJECTION INTRAMUSCULAR
Status: DISCONTINUED | OUTPATIENT
Start: 2024-09-20 | End: 2024-09-21 | Stop reason: HOSPADM

## 2024-09-20 RX ORDER — OCTREOTIDE ACETATE 100 UG/ML
50 INJECTION, SOLUTION INTRAVENOUS; SUBCUTANEOUS ONCE
Status: COMPLETED | OUTPATIENT
Start: 2024-09-20 | End: 2024-09-20

## 2024-09-20 RX ORDER — LORAZEPAM 2 MG/ML
1.5 INJECTION INTRAMUSCULAR
Status: DISCONTINUED | OUTPATIENT
Start: 2024-09-20 | End: 2024-09-21 | Stop reason: HOSPADM

## 2024-09-20 RX ORDER — CEFTRIAXONE 2 G/1
2000 INJECTION, POWDER, FOR SOLUTION INTRAMUSCULAR; INTRAVENOUS ONCE
Status: COMPLETED | OUTPATIENT
Start: 2024-09-20 | End: 2024-09-20

## 2024-09-20 RX ORDER — LORAZEPAM 2 MG/ML
0.5 INJECTION INTRAMUSCULAR EVERY 4 HOURS PRN
Status: DISCONTINUED | OUTPATIENT
Start: 2024-09-20 | End: 2024-09-21 | Stop reason: HOSPADM

## 2024-09-20 RX ORDER — LORAZEPAM 2 MG/1
4 TABLET ORAL
Status: DISCONTINUED | OUTPATIENT
Start: 2024-09-20 | End: 2024-09-21 | Stop reason: HOSPADM

## 2024-09-20 RX ORDER — LORAZEPAM 1 MG/1
0.5 TABLET ORAL EVERY 4 HOURS PRN
Status: DISCONTINUED | OUTPATIENT
Start: 2024-09-20 | End: 2024-09-21 | Stop reason: HOSPADM

## 2024-09-20 RX ORDER — PANTOPRAZOLE SODIUM 40 MG/10ML
80 INJECTION, POWDER, LYOPHILIZED, FOR SOLUTION INTRAVENOUS 2 TIMES DAILY
Status: DISCONTINUED | OUTPATIENT
Start: 2024-09-21 | End: 2024-09-21 | Stop reason: HOSPADM

## 2024-09-20 RX ORDER — LORAZEPAM 1 MG/1
1 TABLET ORAL EVERY 4 HOURS PRN
Status: DISCONTINUED | OUTPATIENT
Start: 2024-09-20 | End: 2024-09-21 | Stop reason: HOSPADM

## 2024-09-20 RX ORDER — ATORVASTATIN CALCIUM 40 MG/1
40 TABLET, FILM COATED ORAL NIGHTLY
Status: DISCONTINUED | OUTPATIENT
Start: 2024-09-20 | End: 2024-09-21 | Stop reason: HOSPADM

## 2024-09-20 RX ORDER — PROMETHAZINE HYDROCHLORIDE 25 MG/1
12.5-25 TABLET ORAL EVERY 4 HOURS PRN
Status: DISCONTINUED | OUTPATIENT
Start: 2024-09-20 | End: 2024-09-21 | Stop reason: HOSPADM

## 2024-09-20 RX ORDER — LORAZEPAM 2 MG/ML
1 INJECTION INTRAMUSCULAR ONCE
Status: COMPLETED | OUTPATIENT
Start: 2024-09-20 | End: 2024-09-20

## 2024-09-20 RX ORDER — PROMETHAZINE HYDROCHLORIDE 25 MG/1
12.5-25 SUPPOSITORY RECTAL EVERY 4 HOURS PRN
Status: DISCONTINUED | OUTPATIENT
Start: 2024-09-20 | End: 2024-09-21 | Stop reason: HOSPADM

## 2024-09-20 RX ORDER — ONDANSETRON 4 MG/1
4 TABLET, ORALLY DISINTEGRATING ORAL EVERY 4 HOURS PRN
Status: DISCONTINUED | OUTPATIENT
Start: 2024-09-20 | End: 2024-09-21 | Stop reason: HOSPADM

## 2024-09-20 RX ORDER — SODIUM CHLORIDE, SODIUM LACTATE, POTASSIUM CHLORIDE, CALCIUM CHLORIDE 600; 310; 30; 20 MG/100ML; MG/100ML; MG/100ML; MG/100ML
INJECTION, SOLUTION INTRAVENOUS CONTINUOUS
Status: DISCONTINUED | OUTPATIENT
Start: 2024-09-20 | End: 2024-09-21 | Stop reason: HOSPADM

## 2024-09-20 RX ORDER — PROCHLORPERAZINE EDISYLATE 5 MG/ML
5-10 INJECTION INTRAMUSCULAR; INTRAVENOUS EVERY 4 HOURS PRN
Status: DISCONTINUED | OUTPATIENT
Start: 2024-09-20 | End: 2024-09-21 | Stop reason: HOSPADM

## 2024-09-20 RX ORDER — ONDANSETRON 2 MG/ML
4 INJECTION INTRAMUSCULAR; INTRAVENOUS EVERY 4 HOURS PRN
Status: DISCONTINUED | OUTPATIENT
Start: 2024-09-20 | End: 2024-09-21 | Stop reason: HOSPADM

## 2024-09-20 RX ADMIN — OCTREOTIDE ACETATE 50 MCG/HR: 200 INJECTION, SOLUTION INTRAVENOUS; SUBCUTANEOUS at 21:53

## 2024-09-20 RX ADMIN — PANTOPRAZOLE SODIUM 80 MG: 40 INJECTION, POWDER, FOR SOLUTION INTRAVENOUS at 21:47

## 2024-09-20 RX ADMIN — CEFTRIAXONE SODIUM 2000 MG: 2 INJECTION, POWDER, FOR SOLUTION INTRAMUSCULAR; INTRAVENOUS at 20:58

## 2024-09-20 RX ADMIN — OCTREOTIDE ACETATE 50 MCG: 100 INJECTION, SOLUTION INTRAVENOUS; SUBCUTANEOUS at 20:59

## 2024-09-20 RX ADMIN — LORAZEPAM 1 MG: 2 INJECTION INTRAMUSCULAR; INTRAVENOUS at 20:58

## 2024-09-20 ASSESSMENT — ENCOUNTER SYMPTOMS
SHORTNESS OF BREATH: 0
DIARRHEA: 0
FEVER: 0
COUGH: 0
CHILLS: 0

## 2024-09-20 ASSESSMENT — LIFESTYLE VARIABLES
HEADACHE, FULLNESS IN HEAD: NOT PRESENT
VISUAL DISTURBANCES: NOT PRESENT
AGITATION: NORMAL ACTIVITY
ORIENTATION AND CLOUDING OF SENSORIUM: ORIENTED AND CAN DO SERIAL ADDITIONS
TREMOR: NO TREMOR
TOTAL SCORE: 0
ANXIETY: NO ANXIETY (AT EASE)
PAROXYSMAL SWEATS: NO SWEAT VISIBLE
AUDITORY DISTURBANCES: NOT PRESENT
NAUSEA AND VOMITING: NO NAUSEA AND NO VOMITING

## 2024-09-20 ASSESSMENT — FIBROSIS 4 INDEX: FIB4 SCORE: 7.61

## 2024-09-21 VITALS
BODY MASS INDEX: 23.72 KG/M2 | HEIGHT: 68 IN | SYSTOLIC BLOOD PRESSURE: 90 MMHG | TEMPERATURE: 97.6 F | WEIGHT: 156.53 LBS | RESPIRATION RATE: 18 BRPM | DIASTOLIC BLOOD PRESSURE: 50 MMHG | HEART RATE: 80 BPM | OXYGEN SATURATION: 95 %

## 2024-09-21 LAB
ABO + RH BLD: NORMAL
ALBUMIN SERPL BCP-MCNC: 3.1 G/DL (ref 3.2–4.9)
ALP SERPL-CCNC: 174 U/L (ref 30–99)
ALT SERPL-CCNC: 48 U/L (ref 2–50)
ANION GAP SERPL CALC-SCNC: 13 MMOL/L (ref 7–16)
AST SERPL-CCNC: 98 U/L (ref 12–45)
BILIRUB CONJ SERPL-MCNC: 0.9 MG/DL (ref 0.1–0.5)
BILIRUB INDIRECT SERPL-MCNC: 0.8 MG/DL (ref 0–1)
BILIRUB SERPL-MCNC: 1.7 MG/DL (ref 0.1–1.5)
BUN SERPL-MCNC: 13 MG/DL (ref 8–22)
CALCIUM SERPL-MCNC: 8.4 MG/DL (ref 8.5–10.5)
CHLORIDE SERPL-SCNC: 104 MMOL/L (ref 96–112)
CO2 SERPL-SCNC: 21 MMOL/L (ref 20–33)
CREAT SERPL-MCNC: 0.42 MG/DL (ref 0.5–1.4)
ERYTHROCYTE [DISTWIDTH] IN BLOOD BY AUTOMATED COUNT: 42.5 FL (ref 35.9–50)
GFR SERPLBLD CREATININE-BSD FMLA CKD-EPI: 118 ML/MIN/1.73 M 2
GLUCOSE SERPL-MCNC: 89 MG/DL (ref 65–99)
HAV IGM SERPL QL IA: ABNORMAL
HBV CORE IGM SER QL: ABNORMAL
HBV SURFACE AG SER QL: ABNORMAL
HCT VFR BLD AUTO: 37.7 % (ref 37–47)
HCT VFR BLD AUTO: 38 % (ref 37–47)
HCV AB SER QL: REACTIVE
HGB BLD-MCNC: 12.4 G/DL (ref 12–16)
HGB BLD-MCNC: 12.5 G/DL (ref 12–16)
MAGNESIUM SERPL-MCNC: 1.7 MG/DL (ref 1.5–2.5)
MCH RBC QN AUTO: 29.8 PG (ref 27–33)
MCHC RBC AUTO-ENTMCNC: 32.9 G/DL (ref 32.2–35.5)
MCV RBC AUTO: 90.6 FL (ref 81.4–97.8)
PHOSPHATE SERPL-MCNC: 4.4 MG/DL (ref 2.5–4.5)
PLATELET # BLD AUTO: 86 K/UL (ref 164–446)
PLATELETS.RETICULATED NFR BLD AUTO: 7.7 % (ref 0.6–13.1)
PMV BLD AUTO: 12.3 FL (ref 9–12.9)
POTASSIUM SERPL-SCNC: 3.6 MMOL/L (ref 3.6–5.5)
PROT SERPL-MCNC: 7 G/DL (ref 6–8.2)
RBC # BLD AUTO: 4.16 M/UL (ref 4.2–5.4)
SODIUM SERPL-SCNC: 138 MMOL/L (ref 135–145)
WBC # BLD AUTO: 8.7 K/UL (ref 4.8–10.8)

## 2024-09-21 PROCEDURE — 99239 HOSP IP/OBS DSCHRG MGMT >30: CPT | Mod: GC | Performed by: INTERNAL MEDICINE

## 2024-09-21 PROCEDURE — 99232 SBSQ HOSP IP/OBS MODERATE 35: CPT | Performed by: NURSE PRACTITIONER

## 2024-09-21 PROCEDURE — 85055 RETICULATED PLATELET ASSAY: CPT

## 2024-09-21 PROCEDURE — A9270 NON-COVERED ITEM OR SERVICE: HCPCS | Performed by: EMERGENCY MEDICINE

## 2024-09-21 PROCEDURE — 80048 BASIC METABOLIC PNL TOTAL CA: CPT

## 2024-09-21 PROCEDURE — 84100 ASSAY OF PHOSPHORUS: CPT

## 2024-09-21 PROCEDURE — 87522 HEPATITIS C REVRS TRNSCRPJ: CPT

## 2024-09-21 PROCEDURE — 700111 HCHG RX REV CODE 636 W/ 250 OVERRIDE (IP): Performed by: STUDENT IN AN ORGANIZED HEALTH CARE EDUCATION/TRAINING PROGRAM

## 2024-09-21 PROCEDURE — 700102 HCHG RX REV CODE 250 W/ 637 OVERRIDE(OP): Performed by: EMERGENCY MEDICINE

## 2024-09-21 PROCEDURE — 85018 HEMOGLOBIN: CPT

## 2024-09-21 PROCEDURE — 85014 HEMATOCRIT: CPT

## 2024-09-21 PROCEDURE — 83735 ASSAY OF MAGNESIUM: CPT

## 2024-09-21 PROCEDURE — 36415 COLL VENOUS BLD VENIPUNCTURE: CPT

## 2024-09-21 PROCEDURE — 86901 BLOOD TYPING SEROLOGIC RH(D): CPT

## 2024-09-21 PROCEDURE — 80074 ACUTE HEPATITIS PANEL: CPT

## 2024-09-21 PROCEDURE — A9270 NON-COVERED ITEM OR SERVICE: HCPCS | Performed by: STUDENT IN AN ORGANIZED HEALTH CARE EDUCATION/TRAINING PROGRAM

## 2024-09-21 PROCEDURE — 86900 BLOOD TYPING SEROLOGIC ABO: CPT

## 2024-09-21 PROCEDURE — 80076 HEPATIC FUNCTION PANEL: CPT

## 2024-09-21 PROCEDURE — 700105 HCHG RX REV CODE 258: Performed by: STUDENT IN AN ORGANIZED HEALTH CARE EDUCATION/TRAINING PROGRAM

## 2024-09-21 PROCEDURE — 700102 HCHG RX REV CODE 250 W/ 637 OVERRIDE(OP): Performed by: STUDENT IN AN ORGANIZED HEALTH CARE EDUCATION/TRAINING PROGRAM

## 2024-09-21 PROCEDURE — 85027 COMPLETE CBC AUTOMATED: CPT

## 2024-09-21 RX ORDER — LANOLIN ALCOHOL/MO/W.PET/CERES
100 CREAM (GRAM) TOPICAL DAILY
Qty: 4 TABLET | Refills: 0 | Status: SHIPPED | OUTPATIENT
Start: 2024-09-22 | End: 2024-09-26

## 2024-09-21 RX ORDER — TETRAHYDROZOLINE HCL 0.05 %
1 DROPS OPHTHALMIC (EYE) 4 TIMES DAILY PRN
COMMUNITY

## 2024-09-21 RX ORDER — FOLIC ACID 1 MG/1
1 TABLET ORAL DAILY
Qty: 6 TABLET | Refills: 0 | Status: SHIPPED | OUTPATIENT
Start: 2024-09-22 | End: 2024-09-28

## 2024-09-21 RX ORDER — CIPROFLOXACIN 500 MG/1
500 TABLET, FILM COATED ORAL 2 TIMES DAILY
Qty: 8 TABLET | Refills: 0 | Status: ACTIVE | OUTPATIENT
Start: 2024-09-22 | End: 2024-09-26

## 2024-09-21 RX ORDER — ASPIRIN 81 MG/1
81 TABLET ORAL DAILY
Status: ON HOLD | COMMUNITY
End: 2024-09-21

## 2024-09-21 RX ORDER — OMEPRAZOLE 40 MG/1
40 CAPSULE, DELAYED RELEASE ORAL DAILY
Qty: 7 CAPSULE | Refills: 0 | Status: SHIPPED | OUTPATIENT
Start: 2024-09-21 | End: 2024-09-28

## 2024-09-21 RX ORDER — NICOTINE 21 MG/24HR
1 PATCH, TRANSDERMAL 24 HOURS TRANSDERMAL EVERY 24 HOURS
Qty: 7 PATCH | Refills: 0 | Status: SHIPPED | OUTPATIENT
Start: 2024-09-21 | End: 2024-09-28

## 2024-09-21 RX ADMIN — SODIUM CHLORIDE, POTASSIUM CHLORIDE, SODIUM LACTATE AND CALCIUM CHLORIDE: 600; 310; 30; 20 INJECTION, SOLUTION INTRAVENOUS at 01:13

## 2024-09-21 RX ADMIN — LORAZEPAM 0.5 MG: 1 TABLET ORAL at 09:59

## 2024-09-21 RX ADMIN — PANTOPRAZOLE SODIUM 80 MG: 40 INJECTION, POWDER, FOR SOLUTION INTRAVENOUS at 05:09

## 2024-09-21 RX ADMIN — NICOTINE 14 MG: 14 PATCH TRANSDERMAL at 05:09

## 2024-09-21 RX ADMIN — THERA TABS 1 TABLET: TAB at 05:08

## 2024-09-21 RX ADMIN — Medication 100 MG: at 05:08

## 2024-09-21 RX ADMIN — FOLIC ACID 1 MG: 1 TABLET ORAL at 05:08

## 2024-09-21 SDOH — ECONOMIC STABILITY: TRANSPORTATION INSECURITY
IN THE PAST 12 MONTHS, HAS LACK OF RELIABLE TRANSPORTATION KEPT YOU FROM MEDICAL APPOINTMENTS, MEETINGS, WORK OR FROM GETTING THINGS NEEDED FOR DAILY LIVING?: PATIENT DECLINED

## 2024-09-21 SDOH — ECONOMIC STABILITY: TRANSPORTATION INSECURITY
IN THE PAST 12 MONTHS, HAS THE LACK OF TRANSPORTATION KEPT YOU FROM MEDICAL APPOINTMENTS OR FROM GETTING MEDICATIONS?: PATIENT DECLINED

## 2024-09-21 ASSESSMENT — LIFESTYLE VARIABLES
HEADACHE, FULLNESS IN HEAD: NOT PRESENT
TOTAL SCORE: 2
NAUSEA AND VOMITING: NO NAUSEA AND NO VOMITING
AUDITORY DISTURBANCES: NOT PRESENT
AGITATION: *
ANXIETY: MILDLY ANXIOUS
TOTAL SCORE: 4
AUDITORY DISTURBANCES: NOT PRESENT
HEADACHE, FULLNESS IN HEAD: NOT PRESENT
ON A TYPICAL DAY WHEN YOU DRINK ALCOHOL HOW MANY DRINKS DO YOU HAVE: 3
TREMOR: NO TREMOR
ANXIETY: MILDLY ANXIOUS
AGITATION: NORMAL ACTIVITY
TOTAL SCORE: 4
ANXIETY: MILDLY ANXIOUS
HEADACHE, FULLNESS IN HEAD: NOT PRESENT
EVER FELT BAD OR GUILTY ABOUT YOUR DRINKING: YES
HEADACHE, FULLNESS IN HEAD: NOT PRESENT
AUDITORY DISTURBANCES: NOT PRESENT
AVERAGE NUMBER OF DAYS PER WEEK YOU HAVE A DRINK CONTAINING ALCOHOL: 25
VISUAL DISTURBANCES: NOT PRESENT
NAUSEA AND VOMITING: NO NAUSEA AND NO VOMITING
PAROXYSMAL SWEATS: BARELY PERCEPTIBLE SWEATING. PALMS MOIST
TREMOR: NO TREMOR
PAROXYSMAL SWEATS: NO SWEAT VISIBLE
VISUAL DISTURBANCES: NOT PRESENT
ANXIETY: MILDLY ANXIOUS
ALCOHOL_USE: YES
DOES PATIENT WANT TO TALK TO SOMEONE ABOUT QUITTING: YES
HAVE YOU EVER FELT YOU SHOULD CUT DOWN ON YOUR DRINKING: YES
HEADACHE, FULLNESS IN HEAD: NOT PRESENT
HAVE PEOPLE ANNOYED YOU BY CRITICIZING YOUR DRINKING: YES
AGITATION: *
ORIENTATION AND CLOUDING OF SENSORIUM: ORIENTED AND CAN DO SERIAL ADDITIONS
SUBSTANCE_ABUSE: 1
HOW MANY TIMES IN THE PAST YEAR HAVE YOU HAD 5 OR MORE DRINKS IN A DAY: 10
VISUAL DISTURBANCES: NOT PRESENT
PAROXYSMAL SWEATS: NO SWEAT VISIBLE
AGITATION: NORMAL ACTIVITY
TOTAL SCORE: 3
EVER HAD A DRINK FIRST THING IN THE MORNING TO STEADY YOUR NERVES TO GET RID OF A HANGOVER: YES
VISUAL DISTURBANCES: NOT PRESENT
TREMOR: NO TREMOR
TREMOR: NO TREMOR
NAUSEA AND VOMITING: NO NAUSEA AND NO VOMITING
AGITATION: SOMEWHAT MORE THAN NORMAL ACTIVITY
TOTAL SCORE: 5
CONSUMPTION TOTAL: POSITIVE
VISUAL DISTURBANCES: NOT PRESENT
ORIENTATION AND CLOUDING OF SENSORIUM: CANNOT DO SERIAL ADDITIONS OR IS UNCERTAIN ABOUT DATE
PAROXYSMAL SWEATS: NO SWEAT VISIBLE
TOTAL SCORE: 4
NAUSEA AND VOMITING: MILD NAUSEA WITH NO VOMITING
AUDITORY DISTURBANCES: NOT PRESENT
TOTAL SCORE: 2
NAUSEA AND VOMITING: NO NAUSEA AND NO VOMITING
AUDITORY DISTURBANCES: NOT PRESENT
DOES PATIENT WANT TO STOP DRINKING: YES
ORIENTATION AND CLOUDING OF SENSORIUM: ORIENTED AND CAN DO SERIAL ADDITIONS
TOTAL SCORE: 4
TREMOR: NO TREMOR
PAROXYSMAL SWEATS: *
ORIENTATION AND CLOUDING OF SENSORIUM: CANNOT DO SERIAL ADDITIONS OR IS UNCERTAIN ABOUT DATE
ORIENTATION AND CLOUDING OF SENSORIUM: CANNOT DO SERIAL ADDITIONS OR IS UNCERTAIN ABOUT DATE
ANXIETY: NO ANXIETY (AT EASE)

## 2024-09-21 ASSESSMENT — SOCIAL DETERMINANTS OF HEALTH (SDOH)
WITHIN THE LAST YEAR, HAVE TO BEEN RAPED OR FORCED TO HAVE ANY KIND OF SEXUAL ACTIVITY BY YOUR PARTNER OR EX-PARTNER?: PATIENT DECLINED
IN THE PAST 12 MONTHS, HAS THE ELECTRIC, GAS, OIL, OR WATER COMPANY THREATENED TO SHUT OFF SERVICE IN YOUR HOME?: NO
WITHIN THE LAST YEAR, HAVE YOU BEEN AFRAID OF YOUR PARTNER OR EX-PARTNER?: PATIENT DECLINED
WITHIN THE PAST 12 MONTHS, YOU WORRIED THAT YOUR FOOD WOULD RUN OUT BEFORE YOU GOT THE MONEY TO BUY MORE: NEVER TRUE
WITHIN THE LAST YEAR, HAVE YOU BEEN KICKED, HIT, SLAPPED, OR OTHERWISE PHYSICALLY HURT BY YOUR PARTNER OR EX-PARTNER?: PATIENT DECLINED
WITHIN THE PAST 12 MONTHS, THE FOOD YOU BOUGHT JUST DIDN'T LAST AND YOU DIDN'T HAVE MONEY TO GET MORE: NEVER TRUE
WITHIN THE LAST YEAR, HAVE YOU BEEN HUMILIATED OR EMOTIONALLY ABUSED IN OTHER WAYS BY YOUR PARTNER OR EX-PARTNER?: PATIENT DECLINED

## 2024-09-21 ASSESSMENT — ENCOUNTER SYMPTOMS
NAUSEA: 0
CHILLS: 0
FEVER: 0
NERVOUS/ANXIOUS: 0
BLOOD IN STOOL: 0
FALLS: 0
DIARRHEA: 0
VOMITING: 0
FOCAL WEAKNESS: 0
CONSTIPATION: 0
WEAKNESS: 0
SORE THROAT: 0
COUGH: 0
MYALGIAS: 0

## 2024-09-21 ASSESSMENT — COGNITIVE AND FUNCTIONAL STATUS - GENERAL
SUGGESTED CMS G CODE MODIFIER MOBILITY: CH
DAILY ACTIVITIY SCORE: 24
MOBILITY SCORE: 24
SUGGESTED CMS G CODE MODIFIER DAILY ACTIVITY: CH

## 2024-09-21 ASSESSMENT — FIBROSIS 4 INDEX: FIB4 SCORE: 7.61

## 2024-09-21 ASSESSMENT — PATIENT HEALTH QUESTIONNAIRE - PHQ9
2. FEELING DOWN, DEPRESSED, IRRITABLE, OR HOPELESS: NOT AT ALL
1. LITTLE INTEREST OR PLEASURE IN DOING THINGS: NOT AT ALL
SUM OF ALL RESPONSES TO PHQ9 QUESTIONS 1 AND 2: 0

## 2024-09-21 ASSESSMENT — PAIN DESCRIPTION - PAIN TYPE
TYPE: ACUTE PAIN
TYPE: ACUTE PAIN

## 2024-09-21 NOTE — PROGRESS NOTES
Gastroenterology Progress Note               Author:  JOSE J Deleon Date & Time Created: 9/21/2024 1:18 PM       Patient ID:  Name:             Guillermina Orozco    YOB: 1975  Age:                 48 y.o.  female  MRN:               [id number]        Medical Decision Making, by Problem:  Active Hospital Problems    Diagnosis     Esophageal varices with bleeding in diseases classified elsewhere (HCC) [I85.11]     Dyslipidemia [E78.5]     Alcohol withdrawal syndrome without complication (HCC) [F10.930]     Alcoholic cirrhosis of liver without ascites (HCC) [K70.30]     Right ovarian cyst [N83.201]     Tobacco abuse [Z72.0]     Methamphetamine abuse (HCC) [F15.10]     Acute cystitis without hematuria [N30.00]            Presenting Chief Complaint:  GI team saw the patient at bedside in the emergency room.  The patient was sleeping, difficult to have many full conversation for patient.     Based on chart review from the [employer] system, the patient was admitted to [facility] yesterday and had a upper GI scope right away which show possible variceal bleeding but not able to treat due to too much food in the upper GI.  Upper GI was repeated on September 20, Dr. Huizar from [facility] perform upper GI scope with banding x 3.      Patient AMA [facility] and then came to [facility].         Interval History:    9/21/2024: Patient seen at bedside with  and primary team.  Patient awake, alert, oriented.  She states feeling better now compared to earlier this morning with staff to try to meet her.  She is calm at this time.  Denies nausea, vomiting, abdominal pain.  Denies bowel movement today.  Per chart review, patient with black stool last night.  Hemoglobin stable 12.4-12.5.  Suspect the episode of melena yesterday likely secondary to any with no blood passing rather than active bleed.  BUN also normal/downtrending.      Hospital Medications:  Current Facility-Administered  Medications   Medication Dose Frequency Provider Last Rate Last Admin    LORazepam (Ativan) tablet 0.5 mg  0.5 mg Q4HRS PRN Leoncio Bridges M.D.   0.5 mg at 09/21/24 0959    LORazepam (Ativan) tablet 1 mg  1 mg Q4HRS PRN Leoncio Bridges M.D.        Or    LORazepam (Ativan) injection 0.5 mg  0.5 mg Q4HRS PRN Leoncio Bridges M.D.        LORazepam (Ativan) tablet 2 mg  2 mg Q2HRS PRN Leoncio Bridges M.D.        Or    LORazepam (Ativan) injection 1 mg  1 mg Q2HRS PRN Leoncio Bridges M.D.        LORazepam (Ativan) tablet 3 mg  3 mg Q HOUR PRN Leoncio Bridges M.D.        Or    LORazepam (Ativan) injection 1.5 mg  1.5 mg Q HOUR PRN Leoncio Bridges M.D.        LORazepam (Ativan) tablet 4 mg  4 mg Q15 MIN PRN Leoncio Bridges M.D.        Or    LORazepam (Ativan) injection 2 mg  2 mg Q15 MIN PRN Leoncio Bridges M.D.        lactated ringers infusion   Continuous Monster Canada M.D. 83 mL/hr at 09/21/24 0113 New Bag at 09/21/24 0113    ondansetron (Zofran) syringe/vial injection 4 mg  4 mg Q4HRS PRN Monster Canada M.D.        ondansetron (Zofran ODT) dispertab 4 mg  4 mg Q4HRS PRN Monster Canada M.D.        promethazine (Phenergan) tablet 12.5-25 mg  12.5-25 mg Q4HRS PRN Monster Canada M.D.        promethazine (Phenergan) suppository 12.5-25 mg  12.5-25 mg Q4HRS PRN Monster Canada M.D.        prochlorperazine (Compazine) injection 5-10 mg  5-10 mg Q4HRS PRN Monster Canada M.D.        thiamine (Vitamin B-1) tablet 100 mg  100 mg DAILY Monster Canada M.D.   100 mg at 09/21/24 0508    folic acid (Folvite) tablet 1 mg  1 mg DAILY Monster Canada M.D.   1 mg at 09/21/24 0508    multivitamin tablet 1 Tablet  1 Tablet DAILY Monster Canada M.D.   1 Tablet at 09/21/24 0508    nicotine (Nicoderm) 14 MG/24HR 14 mg  14 mg Daily-0600 Monster Canada M.D.   14 mg at 09/21/24 0509    And    nicotine polacrilex (Nicorette) 2 MG piece 2 mg  2 mg Q HOUR PRN Monster Canada M.D.        atorvastatin (Lipitor) tablet 40 mg  40 mg  "Nightly Monster Canada M.D.        octreotide (SandoSTATIN) 1,250 mcg in  mL Infusion  50 mcg/hr Continuous Monster Canada M.D.   Continue to Floor at 09/21/24 0112    pantoprazole (Protonix) injection 80 mg  80 mg BID Monster Canada M.D.   80 mg at 09/21/24 0509    cefTRIAXone (Rocephin) syringe 2,000 mg  2,000 mg Q24HRS Monster Canada M.D.        LORazepam (Ativan) tablet 0.5 mg  0.5 mg Q4HRS PRN Monster Canada M.D.        LORazepam (Ativan) tablet 1 mg  1 mg Q4HRS PRN Monster Canada M.D.        Or    LORazepam (Ativan) injection 0.5 mg  0.5 mg Q4HRS PRN Monster Canada M.D.        LORazepam (Ativan) tablet 2 mg  2 mg Q2HRS PRN Monster Canada M.D.        Or    LORazepam (Ativan) injection 1 mg  1 mg Q2HRS PRN Monster Canada M.D.        LORazepam (Ativan) tablet 3 mg  3 mg Q HOUR PRN Monster Canada M.D.        Or    LORazepam (Ativan) injection 1.5 mg  1.5 mg Q HOUR PRN Monster Canada M.D.        LORazepam (Ativan) tablet 4 mg  4 mg Q15 MIN PRN Monster Canada M.D.        Or    LORazepam (Ativan) injection 2 mg  2 mg Q15 MIN PRN Monster Canada M.D.       Last reviewed on 9/21/2024 10:30 AM by Arely Yu       Review of Systems:  Review of Systems   Constitutional:  Negative for chills and fever.   HENT:  Negative for congestion and sore throat.    Respiratory:  Negative for cough.    Cardiovascular:  Negative for chest pain.   Gastrointestinal:  Negative for blood in stool, constipation, diarrhea, melena, nausea and vomiting.   Genitourinary:  Negative for dysuria and hematuria.   Musculoskeletal:  Negative for falls and myalgias.   Neurological:  Negative for focal weakness and weakness.   Psychiatric/Behavioral:  Positive for substance abuse. The patient is not nervous/anxious.    All other systems reviewed and are negative.        Vital signs:  Weight/BMI: Body mass index is 23.8 kg/m².  /72   Pulse 87   Temp 36.5 °C (97.7 °F) (Temporal)   Resp 17   Ht 1.727 m (5' 8\")   " Wt 71 kg (156 lb 8.4 oz)   SpO2 96%   Vitals:    09/21/24 0100 09/21/24 0329 09/21/24 0620 09/21/24 0800   BP:  92/47 104/65 118/72   Pulse:  86  87   Resp:  17  17   Temp:  36 °C (96.8 °F)  36.5 °C (97.7 °F)   TempSrc:  Temporal  Temporal   SpO2: 95% 93%  96%   Weight:       Height:         Oxygen Therapy:  Pulse Oximetry: 96 %, O2 (LPM): 2, O2 Delivery Device: Nasal Cannula    Intake/Output Summary (Last 24 hours) at 9/21/2024 1318  Last data filed at 9/21/2024 0800  Gross per 24 hour   Intake 0 ml   Output 0 ml   Net 0 ml         Physical Exam:  Physical Exam  Vitals and nursing note reviewed.   Constitutional:       General: She is awake. She is not in acute distress.     Appearance: She is overweight. She is not ill-appearing.   HENT:      Head: Normocephalic and atraumatic.      Nose: Nose normal. No congestion or rhinorrhea.   Eyes:      General: No scleral icterus.     Extraocular Movements: Extraocular movements intact.      Conjunctiva/sclera: Conjunctivae normal.   Cardiovascular:      Rate and Rhythm: Normal rate.      Pulses: Normal pulses.   Pulmonary:      Effort: Pulmonary effort is normal. No respiratory distress.      Breath sounds: Normal breath sounds.   Abdominal:      General: Abdomen is flat. Bowel sounds are normal. There is no distension.      Palpations: Abdomen is soft.      Tenderness: There is no abdominal tenderness. There is no guarding.   Musculoskeletal:      Right lower leg: No edema.      Left lower leg: No edema.   Skin:     General: Skin is warm and dry.      Capillary Refill: Capillary refill takes less than 2 seconds.      Coloration: Skin is not jaundiced.   Neurological:      General: No focal deficit present.      Mental Status: She is alert and oriented to person, place, and time. Mental status is at baseline.   Psychiatric:         Mood and Affect: Mood normal.         Behavior: Behavior normal. Behavior is cooperative.             Labs:  Recent Labs     09/19/24  0216  24  0120   SODIUM  --  136 138   POTASSIUM  --  3.9 3.6   CHLORIDE  --  102 104   CO2  --     BUN  --  13 13   CREATININE  --  0.45* 0.42*   PHOSPHORUS 3.2  --   --    CALCIUM  --  8.6 8.4*     Recent Labs     24  0120   ALTSGPT 46 48   ASTSGOT 95* 98*   ALKPHOSPHAT 179* 174*   TBILIRUBIN 2.0* 1.7*   DBILIRUBIN  --  0.9*   LIPASE 51  --    GLUCOSE 101* 89     Recent Labs     24  0213 24  0454 24  0120   WBC  --   --  8.7 8.7   NEUTSPOLYS 52.1 46.6 64.20  --    LYMPHOCYTES 34.2 38.6 25.10  --    MONOCYTES 10.2 11.2 9.40  --    EOSINOPHILS 2.1 2.8 0.80  --    BASOPHILS 1.4* 0.8 0.20  --    ASTSGOT  --   --  95* 98*   ALTSGPT  --   --  46 48   ALKPHOSPHAT  --   --  179* 174*   TBILIRUBIN  --   --  2.0* 1.7*     Recent Labs     24  0454 24  1114 24  01224  1020   RBC 3.80*  --  4.22 4.16*  --    HEMOGLOBIN 11.5*  11.5*   < > 12.4 12.4 12.5   HEMATOCRIT 33.6*  33.6*   < > 37.8 37.7 38.0   PLATELETCT 85*  --  92* 86*  --    PROTHROMBTM  --   --  16.3*  --   --    APTT  --   --  31.6  --   --    INR  --   --  1.31*  --   --     < > = values in this interval not displayed.     Recent Results (from the past 24 hour(s))   EKG    Collection Time: 24  8:25 PM   Result Value Ref Range    Report       [facility]    Test Date:  2024  Pt Name:    YUNI HEBERT     Department: ER  MRN:        [id number]                      Room:       [facility]  Gender:     Female                       Technician: [id number]  :        1975                   Requested By:ER TRIAGE PROTOCOL  Order #:    [id number]                    Reading MD:    Measurements  Intervals                                Axis  Rate:       100                          P:          67  KY:         137                          QRS:        76  QRSD:       82                           T:           42  QT:         354  QTc:        457    Interpretive Statements  Sinus tachycardia  Low voltage, precordial leads  Compared to ECG 05/23/2024 20:19:20  No significant changes     COD (ADULT)    Collection Time: 09/20/24  8:42 PM   Result Value Ref Range    ABO Grouping Only A     Rh Grouping Only POS     Antibody Screen-Cod NEG    CBC WITH DIFFERENTIAL    Collection Time: 09/20/24  8:42 PM   Result Value Ref Range    WBC 8.7 4.8 - 10.8 K/uL    RBC 4.22 4.20 - 5.40 M/uL    Hemoglobin 12.4 12.0 - 16.0 g/dL    Hematocrit 37.8 37.0 - 47.0 %    MCV 89.6 81.4 - 97.8 fL    MCH 29.4 27.0 - 33.0 pg    MCHC 32.8 32.2 - 35.5 g/dL    RDW 42.0 35.9 - 50.0 fL    Platelet Count 92 (L) 164 - 446 K/uL    MPV 11.9 9.0 - 12.9 fL    Neutrophils-Polys 64.20 44.00 - 72.00 %    Lymphocytes 25.10 22.00 - 41.00 %    Monocytes 9.40 0.00 - 13.40 %    Eosinophils 0.80 0.00 - 6.90 %    Basophils 0.20 0.00 - 1.80 %    Immature Granulocytes 0.30 0.00 - 0.90 %    Nucleated RBC 0.00 0.00 - 0.20 /100 WBC    Neutrophils (Absolute) 5.60 1.82 - 7.42 K/uL    Lymphs (Absolute) 2.19 1.00 - 4.80 K/uL    Monos (Absolute) 0.82 0.00 - 0.85 K/uL    Eos (Absolute) 0.07 0.00 - 0.51 K/uL    Baso (Absolute) 0.02 0.00 - 0.12 K/uL    Immature Granulocytes (abs) 0.03 0.00 - 0.11 K/uL    NRBC (Absolute) 0.00 K/uL   COMP METABOLIC PANEL    Collection Time: 09/20/24  8:42 PM   Result Value Ref Range    Sodium 136 135 - 145 mmol/L    Potassium 3.9 3.6 - 5.5 mmol/L    Chloride 102 96 - 112 mmol/L    Co2 23 20 - 33 mmol/L    Anion Gap 11.0 7.0 - 16.0    Glucose 101 (H) 65 - 99 mg/dL    Bun 13 8 - 22 mg/dL    Creatinine 0.45 (L) 0.50 - 1.40 mg/dL    Calcium 8.6 8.5 - 10.5 mg/dL    Correct Calcium 9.2 8.5 - 10.5 mg/dL    AST(SGOT) 95 (H) 12 - 45 U/L    ALT(SGPT) 46 2 - 50 U/L    Alkaline Phosphatase 179 (H) 30 - 99 U/L    Total Bilirubin 2.0 (H) 0.1 - 1.5 mg/dL    Albumin 3.3 3.2 - 4.9 g/dL    Total Protein 7.1 6.0 - 8.2 g/dL    Globulin 3.8 (H) 1.9 - 3.5 g/dL    A-G Ratio  0.9 g/dL   LIPASE    Collection Time: 09/20/24  8:42 PM   Result Value Ref Range    Lipase 51 11 - 82 U/L   PROTHROMBIN TIME    Collection Time: 09/20/24  8:42 PM   Result Value Ref Range    PT 16.3 (H) 12.0 - 14.6 sec    INR 1.31 (H) 0.87 - 1.13   APTT    Collection Time: 09/20/24  8:42 PM   Result Value Ref Range    APTT 31.6 24.7 - 36.0 sec   PLATELET ESTIMATE    Collection Time: 09/20/24  8:42 PM   Result Value Ref Range    Plt Estimation Decreased    IMMATURE PLT FRACTION    Collection Time: 09/20/24  8:42 PM   Result Value Ref Range    Imm. Plt Fraction 4.9 0.6 - 13.1 %   ESTIMATED GFR    Collection Time: 09/20/24  8:42 PM   Result Value Ref Range    GFR (CKD-EPI) 116 >60 mL/min/1.73 m 2   ABO Rh Confirm    Collection Time: 09/21/24  1:20 AM   Result Value Ref Range    ABO Rh Confirm A POS    Basic Metabolic Panel (BMP)    Collection Time: 09/21/24  1:20 AM   Result Value Ref Range    Sodium 138 135 - 145 mmol/L    Potassium 3.6 3.6 - 5.5 mmol/L    Chloride 104 96 - 112 mmol/L    Co2 21 20 - 33 mmol/L    Glucose 89 65 - 99 mg/dL    Bun 13 8 - 22 mg/dL    Creatinine 0.42 (L) 0.50 - 1.40 mg/dL    Calcium 8.4 (L) 8.5 - 10.5 mg/dL    Anion Gap 13.0 7.0 - 16.0   CBC without Differential    Collection Time: 09/21/24  1:20 AM   Result Value Ref Range    WBC 8.7 4.8 - 10.8 K/uL    RBC 4.16 (L) 4.20 - 5.40 M/uL    Hemoglobin 12.4 12.0 - 16.0 g/dL    Hematocrit 37.7 37.0 - 47.0 %    MCV 90.6 81.4 - 97.8 fL    MCH 29.8 27.0 - 33.0 pg    MCHC 32.9 32.2 - 35.5 g/dL    RDW 42.5 35.9 - 50.0 fL    Platelet Count 86 (L) 164 - 446 K/uL    MPV 12.3 9.0 - 12.9 fL   IMMATURE PLT FRACTION    Collection Time: 09/21/24  1:20 AM   Result Value Ref Range    Imm. Plt Fraction 7.7 0.6 - 13.1 %   ESTIMATED GFR    Collection Time: 09/21/24  1:20 AM   Result Value Ref Range    GFR (CKD-EPI) 118 >60 mL/min/1.73 m 2   HEPATIC FUNCTION PANEL    Collection Time: 09/21/24  1:20 AM   Result Value Ref Range    Alkaline Phosphatase 174 (H) 30 - 99  U/L    AST(SGOT) 98 (H) 12 - 45 U/L    ALT(SGPT) 48 2 - 50 U/L    Total Bilirubin 1.7 (H) 0.1 - 1.5 mg/dL    Direct Bilirubin 0.9 (H) 0.1 - 0.5 mg/dL    Indirect Bilirubin 0.8 0.0 - 1.0 mg/dL    Albumin 3.1 (L) 3.2 - 4.9 g/dL    Total Protein 7.0 6.0 - 8.2 g/dL   HEMOGLOBIN AND HEMATOCRIT    Collection Time: 09/21/24 10:20 AM   Result Value Ref Range    Hemoglobin 12.5 12.0 - 16.0 g/dL    Hematocrit 38.0 37.0 - 47.0 %   HEPATITIS PANEL ACUTE(4 COMPONENTS)    Collection Time: 09/21/24 10:20 AM   Result Value Ref Range    Hepatitis B Surface Antigen Non-Reactive Non-Reactive    Hepatitis B Cors Ab,IgM Non-Reactive Non-Reactive    Hepatitis A Virus Ab, IgM Non-Reactive Non-Reactive    Hepatitis C Antibody Reactive (A) Non-Reactive       Radiology Review:  DX-CHEST-PORTABLE (1 VIEW)   Final Result      No definite acute cardiac or pulmonary abnormalities are identified.            MDM (Data Review):   -Records reviewed and summarized in current documentation  -I personally reviewed and interpreted the laboratory results  -I personally reviewed the radiology images    Assessment/Recommendations:  Alcohol cirrhosis  Esophageal varices s/p variceal banding x 3  Hematemesis  Right ovarian cyst  Alcohol withdrawal  Methamphetamine abuse      Plan:  Monitor H&H and fluids open wound  PPI and discharge  Follow-up with [place] gastroenterologist  Okay to continue antibiotics on discharge that were previously started.      No further inventions from acute GI team.  GI to sign off.  Please reconsult for any further questions or concerns.    Discussed with patient, primary team, Dr. Bailey    Core Quality Measures   Reviewed items::  Labs, Medications and Radiology reports reviewed

## 2024-09-21 NOTE — ASSESSMENT & PLAN NOTE
Tachycardic in the ED somewhat tremulous.  Started on CIWA protocol  Thiamine, folate, multivitamin  Extensive cessation counseling provided

## 2024-09-21 NOTE — ED PROVIDER NOTES
ER Provider Note    Scribed for Leoncio Bridges M.d. by Keke Lewis. 9/20/2024  8:35 PM    Primary Care Provider: JOSE J Ghosh    CHIEF COMPLAINT   Chief Complaint   Patient presents with    Upper GI Bleed     Hematemesis x 2 days     Lower GI Bleed     Fresh Blood in stool x 2 days      EXTERNAL RECORDS REVIEWED  Hospital records reviewed showed an admission note where the patient was discharged today after being evaluated for esophageal varices.     HPI/ROS  LIMITATION TO HISTORY   Select: : None  OUTSIDE HISTORIAN(S):  Significant other at bedside who endorsed the patient's symptoms and provided additional history as seen below.     Guillermina Orozco is a 48 y.o. female who presents to the ED complaining of lower GI bleed onset two days ago. Patient was admitted at [facility] for her symptoms and was discharged today. However since being home, the patient has had two episodes of dark stool and abdominal discomfort. Patient added that she typically drinks hard liquor and has not had a drink in the past two days. She thinks that she is slightly in withdrawal.  added that the patient was told to follow up with cardiology for a possible blockage.     PAST MEDICAL HISTORY  Past Medical History:   Diagnosis Date    Acute pain     Arthritis     At risk for sleep apnea     Breath shortness     thinks she overexerts due to pain    Dental disorder     full dentures    Hepatitis C 1990    doesn't get sick, has had since 18 years old    Hypertension     no medication    Snoring     if on her back       SURGICAL HISTORY  No past surgical history noted.    FAMILY HISTORY  No family history noted.    SOCIAL HISTORY   reports that she has been smoking cigarettes. She started smoking about 37 years ago. She has a 18.9 pack-year smoking history. She does not have any smokeless tobacco history on file. She reports current alcohol use of about 1.2 oz of alcohol per week. She reports that she does not currently use  drugs.    CURRENT MEDICATIONS  Previous Medications    ATORVASTATIN (LIPITOR) 40 MG TAB    Take 1 Tablet by mouth every evening.       ALLERGIES  Codeine    PHYSICAL EXAM  /79   Pulse (!) 112   Temp 36.8 °C (98.2 °F) (Temporal)   Resp 18   SpO2 90%   Constitutional: Alert in no apparent distress.  HENT: No signs of trauma, Bilateral external ears normal, Nose normal. Uvula midline.   Eyes: Pupils are equal and reactive, Conjunctiva normal, Non-icteric.   Neck: Normal range of motion, No tenderness, Supple, No stridor.   Lymphatic: No lymphadenopathy noted.   Cardiovascular: Tachycardic rate and rhythm, no murmurs.   Thorax & Lungs: Normal breath sounds, No respiratory distress, No wheezing, No chest tenderness.   Abdomen: Bowel sounds normal, Soft, No tenderness, No peritoneal signs, No masses, No pulsatile masses.   Skin: Warm, Dry, No erythema, No rash.   Back: No bony tenderness, No CVA tenderness.   Extremities: Intact distal pulses, No edema, No tenderness, No cyanosis.  Musculoskeletal: Good range of motion in all major joints. No tenderness to palpation or major deformities noted.   Neurologic: Alert , Normal motor function, Normal sensory function, No focal deficits noted.   Psychiatric: Affect normal, Judgment normal, Mood normal.      DIAGNOSTIC STUDIES    EKG/LABS  Labs Reviewed   CBC WITH DIFFERENTIAL - Abnormal; Notable for the following components:       Result Value    Platelet Count 92 (*)     All other components within normal limits   COMP METABOLIC PANEL - Abnormal; Notable for the following components:    Glucose 101 (*)     Creatinine 0.45 (*)     AST(SGOT) 95 (*)     Alkaline Phosphatase 179 (*)     Total Bilirubin 2.0 (*)     Globulin 3.8 (*)     All other components within normal limits   PROTHROMBIN TIME - Abnormal; Notable for the following components:    PT 16.3 (*)     INR 1.31 (*)     All other components within normal limits   COD (ADULT)   LIPASE   APTT   PLATELET ESTIMATE    IMMATURE PLT FRACTION   ESTIMATED GFR   ABO RH CONFIRM      I have independently interpreted this EKG    RADIOLOGY/PROCEDURES   The attending emergency physician has independently interpreted the diagnostic imaging associated with this visit and am waiting the final reading from the radiologist.       Radiologist interpretation:  No orders to display       COURSE & MEDICAL DECISION MAKING     ASSESSMENT, COURSE AND PLAN  Care Narrative:   8:47 PM  - Patient seen and examined at bedside. Discussed plan of care, including a diagnostic work up with labs. I informed patient of the plan for admission for further evaluation of her symptoms. Patient agrees to the plan of care.     2x large bore IVs placed. Type & screen sent.  Tachycardic, normotensive, no hemorraghic shock.  Given empiric PPI for PUD.  Given empiric octreotide for variceal bleed.  Given ceftriaxone for SBP prophylaxis.  Unclear etiology at this time.   CBC w/o acute blood loss anemia, 92 thrombocytopenia.  BMP  uremia, unremarkable electrolyte abnormalities.  GI Dr. ANGEL agreed w/ management, recommend NPO at [facility].  Patient admitted to medicine.    CRITICAL CARE  The very real possibilty of a deterioration of this patient's condition required the highest level of my preparedness for sudden, emergent intervention.  I provided critical care services, which included medication orders, frequent reevaluations of the patient's condition and response to treatment, ordering and reviewing test results, and discussing the case with various consultants.  The critical care time associated with the care of the patient was 35 minutes. Review chart for interventions. This time is exclusive of any other billable procedures.      10:15 PM  -  I discussed the patient's case and the above findings with Dr. Canada (Hospitalist) who agrees to evaluate the patient for hospitalization.       DISPOSITION AND DISCUSSIONS  I have discussed management of the patient with the following  physicians and AROLDO's:  Dr. GUTIÉRREZ (Hospitalist), GI ANGEL    DISPOSITION:  Patient will be hospitalized by Dr. GUTIÉRREZ (Hospitalist) in guarded condition.     FINAL DIAGNOSIS  1. Gastrointestinal hemorrhage, unspecified gastrointestinal hemorrhage type       I, Keke Lewis (Scribe), am scribing for, and in the presence of, Leoncio Bridges M.D..    Electronically signed by: Keke Lewis (Scribe), 9/20/2024    I, Leoncio Bridges M.D. personally performed the services described in this documentation, as scribed by Keke Lewis in my presence, and it is both accurate and complete.      The note accurately reflects work and decisions made by me.  Leoncio Bridges M.D.  9/20/2024  10:12 PM

## 2024-09-21 NOTE — CONSULTS
Date of Consultation:  9/20/2024    Patient: : Guillermina Orozco  MRN: [id number]    Referring Physician:  Dr. Leoncio Bridges MD.      GI:Arnulfo Asif M.D.     Reason for Consultation: GI bleeding, cirrhosis.    History of Present Illness:     GI team saw the patient at bedside in the emergency room.  The patient was sleeping, difficult to have many full conversation for patient.    Based on chart review from the [email or web address] system, the patient was admitted to [facility] yesterday and had a upper GI scope right away which show possible variceal bleeding but not able to treat due to too much food in the upper GI.  Upper GI was repeated on September 20, Dr. Huizar from [facility] perform upper GI scope with banding x 3.     Patient AMA [facility] and then came to [facility].          Past Medical History:   Diagnosis Date    Hepatitis C 1990    doesn't get sick, has had since 18 years old    Acute pain     Arthritis     At risk for sleep apnea     Breath shortness     thinks she overexerts due to pain    Dental disorder     full dentures    Hypertension     no medication    Snoring     if on her back         No past surgical history on file.    No family history on file.    Social History     Socioeconomic History    Marital status:    Tobacco Use    Smoking status: Every Day     Current packs/day: 0.50     Average packs/day: 0.5 packs/day for 37.7 years (18.9 ttl pk-yrs)     Types: Cigarettes     Start date: 1987    Tobacco comments:     1/2 packs per day    Vaping Use    Vaping status: Never Used   Substance and Sexual Activity    Alcohol use: Yes     Alcohol/week: 1.2 oz     Types: 2 Shots of liquor per week     Comment: 2 times a week    Drug use: Not Currently     Comment: last meth use 5/15/2024     Social Determinants of Health     Financial Resource Strain: Medium Risk (5/15/2024)    Overall Financial Resource Strain (CARDIA)     Difficulty of Paying Living Expenses: Somewhat hard   Food Insecurity:  Unknown (9/19/2024)    Received from [facility]    Unable to Assess     Is the patient able to answer the following questions today?: No   Transportation Needs: Unknown (9/19/2024)    Received from [facility]    Unable to Assess     Is the patient able to answer the following questions today?: No   Physical Activity: Sufficiently Active (5/15/2024)    Exercise Vital Sign     Days of Exercise per Week: 5 days     Minutes of Exercise per Session: 120 min   Stress: Stress Concern Present (5/15/2024)    [employer] - Occupational Stress Questionnaire     Feeling of Stress : To some extent   Social Connections: Not on File (9/16/2024)    Received from "[employer]    Social Connections     Connectedness: 0   Intimate Partner Violence: Not At Risk (5/17/2024)    Humiliation, Afraid, Rape, and Kick questionnaire     Fear of Current or Ex-Partner: No     Emotionally Abused: No     Physically Abused: No     Sexually Abused: No   Housing Stability: Unknown (9/19/2024)    Received from [facility]    Unable to Assess     Is the patient able to answer the following questions today?: No       Not able to perform review of system.    Abdomen soft, no evidence of acute abdomen.      Physical Exam:  Vitals:    09/20/24 1955 09/20/24 2014   BP: 130/75 127/79   Pulse: (!) 107 (!) 112   Resp: 18    Temp: 36.8 °C (98.2 °F)    TempSrc: Temporal    SpO2: 90% 90%             Labs:  Recent Labs     09/19/24  0520 09/19/24  1155 09/20/24  0454 09/20/24  1114 09/20/24 2042   WBC  --   --   --   --  8.7   RBC 4.56  --  3.80*  --  4.22   HEMOGLOBIN 13.5   < > 11.5*  11.5* 12.6 12.4   HEMATOCRIT 39.5   < > 33.6*  33.6* 37.1 37.8   MCV 86.4  --  88.3  --  89.6   MCH 29.6  --  30.4  --  29.4   MCHC 34.3  --  34.4  --  32.8   RDW 13.4  --  13.1  --  42.0   PLATELETCT 101*  --  85*  --  92*   MPV 10.1  --  9.8  --  11.9    < > = values in this interval not displayed.     Recent  Labs     09/20/24 2042   SODIUM 136   POTASSIUM 3.9   CHLORIDE 102   CO2 23   GLUCOSE 101*   BUN 13     Recent Labs     09/20/24 2042   APTT 31.6   INR 1.31*       Recent Labs     09/20/24 2042   ASTSGOT 95*   ALTSGPT 46   TBILIRUBIN 2.0*   ALKPHOSPHAT 179*   GLOBULIN 3.8*   INR 1.31*         Imaging:  CT-CTA HEART W/3D IMAGE  Narrative: 5/17/2024 10:24 AM    HISTORY/REASON FOR EXAM:  coronary CTA,  Abnormal stress test.    The study is not performed on an asymptomatic, low CHD risk patient for initial detection and risk assessment.    TECHNIQUE/EXAM DESCRIPTION:  CTA coronary arteries with 3D reconstructions without calcium scoring.    Following a pre-screening tool, the patient was prescribed 100 mg of p.o. metoprolol to be taken approximately one hour prior to the examination for heart rate control.    Upon arrival at the testing facility, the patient's vital signs were taken. An 18-gauge IV was inserted. The patient was placed in a supine position on the CT table. Following CT planning, manual timing bolus was performed and the patient then received   0.4 mg sublingual nitroglycerin. Subsequently, 100 cc intravenous Omnipaque 350 contrast was administrated and ECG gated CT scanning of the heart was performed. 3D images as well as MIP images and MPR images were reconstructed and reviewed on PACS.    Low dose optimization technique was utilized for this CT exam including automated exposure control and adjustment of the mA and/or kV according to patient size.    COMPARISON:  None.    FINDINGS:  Limitations: No significant bolus or motion limitations are present.    Coronary CTA:  Dominance: Right dominant circulation.    Left Main: Large caliber vessel with a normal takeoff from the left coronary cusp that bifurcates to form a left anterior descending artery and a left circumflex artery. Small amount of calcified and noncalcified plaque.  No significant interval   reduction.    LAD and Diagonals: Patent. No  plaque or stenosis. No ramus intermedius.    LCX and Obtuse Marginals: Patent. Small caliber circumflex artery.  No focal high-grade stenosis or occlusion.    RCA, Posterior Descending and Posterolateral Branches: Patent. Calcified plaque at the origin.  No significant luminal reduction.    Cardiac Structure and Morphology:  Left atrium: Normal in size. No thrombus in the left atrial appendage.  Left ventricle: Normal in size. No stigmata of prior infarction. No abnormal filling defect.  Pericardium: Normal thickness. No pericardial effusion or calcification.  Cardiac valves: No thickening or calcifications in the aortic or mitral valves.  Aorta: No aneurysm of the visualized thoracic aorta.    3D angiographic/MIP images of the vasculature confirm the vascular findings as described above.    Extracardiac findings:    Lungs: Clear.  Mediastinum: No lymphadenopathy.  Upper abdomen: Unremarkable.  Bones: Unremarkable.  Impression: 1.  Scattered calcified plaque without focal high-grade stenosis or coronary artery occlusion.  2.  Small caliber circumflex artery.            Impressions:  GI team saw the patient at bedside in the emergency room.  The patient was sleeping, difficult to have many full conversation for patient.    Based on chart review from the [employer] system, the patient was admitted to [facility] yesterday and had a upper GI scope right away which show possible variceal bleeding but not able to treat due to too much food in the upper GI.  Upper GI was repeated on September 20, Dr. Huizar from [facility] perform upper GI scope with banding x 3.     Based on chart review, Dr. Huizar was able to place rubber band x 3 for varices.  No more evidence of hematemesis at this time.    Please n.p.o. the patient for now.  Based on the following lab, overnight medical observation, GI team will see the patient again at bedside early morning and then decide the timing of repeat upper GI scope.    Agreed to  continue IV ceftriaxone for cirrhotic bleeding, octreotide, PPI, withdrawal protocol per the primary team.          This note was generated using voice recognition software which has a small chance of producing errors of grammar and possibly content. I have made every reasonable attempt to find and correct any obvious errors, but expect that some may not be found prior to finalization of this note.     Patient comes in with shortness of breath x1 week. Patient was placed on steroids last Thursday without improvement. Patient states symptoms worsened today. Denies chest pain, fever. Hx COPD, CHF.

## 2024-09-21 NOTE — ED TRIAGE NOTES
48 y.o. female Guillermina Orozco    Chief Complaint   Patient presents with    Upper GI Bleed     Hematemesis x 2 days     Lower GI Bleed     Fresh Blood in stool x 2 days      BIB EMS to [facility]   Picked up from Home  EMS called by patient     Patient presented to ED with above complaints, was admitted on 9/19/2024 at [facility] ED on 9/19/2024 with complaint of lower abdominal cramping and black stool. Patient endorses she has fresh blood in vomiting and diarrhea after she left facility and went home .     At [facility] on 9/19 EGD was performed and they noticed esophageal varices, they were unable to band because there was food particles patient again underwent EGD on 09/20 they placed 3 bands. However patient Left AMA for reason being not treated well.     Patient has past medical history of liver cirrhosis, hepatitis-C, alcohol use disorder, methamphetamine abuse, nicotine dependence, thrombocytopenia.      AAO X4 , Respirations even and unlabored on room air , afebrile at this time. Ambulatory from [facility] to [facility].     ED RN protocol initiated for GI bleed       Medications given en route: none     /75   Pulse (!) 107   Temp 36.8 °C (98.2 °F) (Temporal)   Resp 18   SpO2 90%     Past Medical History:   Diagnosis Date    Acute pain     Arthritis     At risk for sleep apnea     Breath shortness     thinks she overexerts due to pain    Dental disorder     full dentures    Hepatitis C 1990    doesn't get sick, has had since 18 years old    Hypertension     no medication    Snoring     if on her back

## 2024-09-21 NOTE — PROGRESS NOTES
Pharmacy Medication Reconciliation      ~Medication reconciliation updated and complete per patient   ~Allergies have been verified and updated   ~No oral ABX within the last 30 days  ~Patient home pharmacy :  [facility] [phone]      ~Anticoagulants (rivaroxaban, apixaban, edoxaban, dabigatran, warfarin, enoxaparin) taken in the last 14 days? No

## 2024-09-21 NOTE — DISCHARGE INSTRUCTIONS
You were admitted to the [facility] for blood in your vomit and stool on 9/20 after getting a  scope and band ligation done at [facility]. You went into alcohol withdrawal and were treated with ativan. You were treated with antibiotics, and other IV medications for your GI bleeding. You did not have any episodes of bloody vomit or blood in the stool during admission and your blood work-up showed a stable hemoglobin. You were evaluated by Gastroenterology and no further need for endoscopy was recommended at this point. You are being discharged on a 4 day course of antibiotic and omeprazole today. Take your medications regularly. Follow up with your gastroenterologist and PCP in a week   You should come to the hospital if you experience any of the following below given signs or symptoms:  -Blood in your stool  -Blood in your vomit  -Worsening dizziness and confusion  -Shortness of breath  -Chest pain  -Fever and chills

## 2024-09-21 NOTE — ASSESSMENT & PLAN NOTE
Patient was at [facility] for melena, she had endoscopy and banded x 3  She left AMA to drink alcohol.  Comes back in with persistent melena  GI evaluated in the ED and will follow patient  Will continue with octreotide drip.  Needs close monitoring on telemetry for any signs of arrhythmia  H/H ordered Q8, transfuse if less than 7  IV ceftriaxone, IV PPI, n.p.o.

## 2024-09-21 NOTE — DISCHARGE SUMMARY
[facility] Internal Medicine Discharge Summary    Attending: Vaishali Galo M.d.  Senior Resident: Dr. Atif Chaparro  Intern:  Dr. Yajaira Alcaraz  Contact Number: [phone]    CHIEF COMPLAINT ON ADMISSION  Chief Complaint   Patient presents with    Upper GI Bleed     Hematemesis x 2 days     Lower GI Bleed     Fresh Blood in stool x 2 days        Reason for Admission  EMS     Admission Date  9/20/2024    CODE STATUS  Full Code    HPI & HOSPITAL COURSE  Guillermina Orozco is a 48 y.o. female who presented 9/20/2024 with melena.  PMH of alcoholic cirrhosis, history of variceal bleed, dyslipidemia, hypertension.  Patient recently admitted at [facility] from 9/19 - 9/20 for melena.  She had a scope done earlier today in [facility] and was banded x 3.  While at [place] she was in alcohol withdrawal and was treated for it.  She also had urinary symptoms and urine cultures were growing E. coli, she was on ceftriaxone. She left AMA from [facility] drink alcohol and then presented to [facility] with persistent melena. However, after she left the ED, she no longer had melena. She denied any lightheadedness, dizziness, abdominal pain, nausea, vomiting, hematemesis, diarrhea, constipation, melena, and hematochezia. Patient's hemoglobin remained stable at ~12.5. GI evaluated the patient and discontinued octreotide. They recommended continuing antibiotics, but could transition to oral once she d/c'd. We prescribed ciprofloxacin 500 mg bid x4 days to complete a total course of 7 days. Therefore patient was discharged in stable condition.    Of note, patient has a diagnosed with Hep C. She reports that she never had treatment. Hep panel was ordered and Hep C was positive. She was to leave on the day it resulted, therefore quantitative Hep C will need to be done outpatient.    Patient was also advised to discontinue alcohol intake. Patient reports she will stop drinking, though she did leave [facility] to go drink after having  esophageal banding.    Therefore, she is discharged in good and stable condition to home with close outpatient follow-up.    The patient recovered much more quickly than anticipated on admission.    Discharge Date  9/21/2024    Physical Exam on Day of Discharge  Physical Exam  Constitutional:       General: She is not in acute distress.     Appearance: She is not ill-appearing, toxic-appearing or diaphoretic.      Comments: Somewhat disheveled   HENT:      Head: Normocephalic and atraumatic.      Nose: Nose normal. No rhinorrhea.      Mouth/Throat:      Mouth: Mucous membranes are moist.      Pharynx: Oropharynx is clear. No oropharyngeal exudate or posterior oropharyngeal erythema.      Comments: No dried blood in the oropharynx.  Eyes:      General: No scleral icterus.        Right eye: No discharge.         Left eye: No discharge.      Extraocular Movements: Extraocular movements intact.      Conjunctiva/sclera: Conjunctivae normal.   Cardiovascular:      Rate and Rhythm: Normal rate and regular rhythm.      Pulses: Normal pulses.      Heart sounds: Normal heart sounds. No murmur heard.     No friction rub. No gallop.   Pulmonary:      Effort: Pulmonary effort is normal. No respiratory distress.      Breath sounds: Normal breath sounds. No wheezing, rhonchi or rales.   Abdominal:      General: Abdomen is flat. Bowel sounds are normal. There is no distension.      Palpations: Abdomen is soft.      Tenderness: There is no abdominal tenderness. There is no guarding or rebound.   Musculoskeletal:         General: Normal range of motion.      Cervical back: Normal range of motion. No rigidity.      Right lower leg: No edema.      Left lower leg: No edema.   Skin:     General: Skin is warm and dry.   Neurological:      General: No focal deficit present.      Mental Status: She is alert and oriented to person, place, and time. Mental status is at baseline.   Psychiatric:         Mood and Affect: Mood normal.          Behavior: Behavior normal.         FOLLOW UP ITEMS POST DISCHARGE  Follow up with PCP and GI consultants.    DISCHARGE DIAGNOSES  Principal Problem:    Esophageal varices with bleeding in diseases classified elsewhere (HCC) (POA: Yes)  Active Problems:    Acute cystitis without hematuria (POA: Yes)    Methamphetamine abuse (HCC) (POA: Yes)    Tobacco abuse (POA: Yes)    Dyslipidemia (POA: Yes)    Alcohol withdrawal syndrome without complication (HCC) (POA: Yes)    Alcoholic cirrhosis of liver without ascites (HCC) (POA: Yes)    Right ovarian cyst (POA: Yes)  Resolved Problems:    * No resolved hospital problems. *      FOLLOW UP  No future appointments.  JOSE J Ghosh  [address] [facility] [id number] [phone]    Follow up      [facility] - [address]  [facility] [id number] [phone]  Schedule an appointment as soon as possible for a visit        MEDICATIONS ON DISCHARGE     Medication List        START taking these medications        Instructions   ciprofloxacin 500 MG Tabs  Start taking on: September 22, 2024  Commonly known as: Cipro   Take 1 Tablet by mouth 2 times a day for 4 days.  Dose: 500 mg     folic acid 1 MG Tabs  Start taking on: September 22, 2024  Commonly known as: Folvite   Take 1 Tablet by mouth every day for 6 days.  Dose: 1 mg     multivitamin Tabs  Start taking on: September 22, 2024   Take 1 Tablet by mouth every day for 6 days.  Dose: 1 Tablet     nicotine 14 MG/24HR Pt24  Commonly known as: Nicoderm   Place 1 Patch on the skin every 24 hours for 7 days.  Dose: 1 Patch     nicotine polacrilex 2 MG Gum  Commonly known as: Nicorette   Take 1 Each by mouth every 1 hour as needed for Smoking Cessation (For nicotine urge) for up to 4 days.  Dose: 2 mg     omeprazole 40 MG delayed-release capsule  Commonly known as: PriLOSEC   Take 1 Capsule by mouth every day for 7 days.  Dose: 40 mg     thiamine 100 MG tablet  Start taking  on: September 22, 2024  Commonly known as: Thiamine   Take 1 Tablet by mouth every day for 4 days.  Dose: 100 mg            CONTINUE taking these medications        Instructions   tetrahydrozoline 0.05 % Soln  Commonly known as: Visine   Administer 1 Drop into both eyes 4 times a day as needed (red eye).  Dose: 1 Drop            STOP taking these medications      aspirin 81 MG EC tablet            ASK your doctor about these medications        Instructions   atorvastatin 40 MG Tabs  Commonly known as: Lipitor   Take 1 Tablet by mouth every evening.  Dose: 40 mg              Allergies  Allergies   Allergen Reactions    Codeine Rash     rash       DIET  Orders Placed This Encounter   Procedures    Diet Order Diet: Clear Liquid     Standing Status:   Standing     Number of Occurrences:   1     Order Specific Question:   Diet:     Answer:   Clear Liquid [10]    Discontinue Diet Tray     Standing Status:   Standing     Number of Occurrences:   1       ACTIVITY  As tolerated.  Weight bearing as tolerated    CONSULTATIONS  GI    PROCEDURES  None    LABORATORY  Lab Results   Component Value Date    SODIUM 138 09/21/2024    POTASSIUM 3.6 09/21/2024    CHLORIDE 104 09/21/2024    CO2 21 09/21/2024    GLUCOSE 89 09/21/2024    BUN 13 09/21/2024    CREATININE 0.42 (L) 09/21/2024    GLOMRATE 137 06/08/2023        Lab Results   Component Value Date    WBC 8.7 09/21/2024    HEMOGLOBIN 12.5 09/21/2024    HEMATOCRIT 38.0 09/21/2024    PLATELETCT 86 (L) 09/21/2024        Total time of the discharge process exceeds 42 minutes.

## 2024-09-21 NOTE — ASSESSMENT & PLAN NOTE
Patient smokes 0.5 PPD.  Nicotine patch and/or gum ordered.   I discussed cessation with patient including starting on nicotine patch and/or gum on discharge.  I also discussed medications to help with cessation with patient including Wellbutrin and Chantix, said she is willing to try Chantix, outpatient follow-up.  Smoking cessation discussed with patient for 4 minutes.

## 2024-09-21 NOTE — ED NOTES
"Patient removed monitor and stated \" I dont want this shit on me\" patient educated, still reluctant.   "

## 2024-09-21 NOTE — H&P
Hospital Medicine History & Physical Note    Date of Service  9/20/2024    Primary Care Physician  KATALINA Ghosh.    Consultants  GI    Specialist Names: Dr Asif    Code Status  Full Code    Chief Complaint  Chief Complaint   Patient presents with    Upper GI Bleed     Hematemesis x 2 days     Lower GI Bleed     Fresh Blood in stool x 2 days        History of Presenting Illness  Guillermina Orozco is a 48 y.o. female who presented 9/20/2024 with melena.  PMH of alcoholic cirrhosis, history of variceal bleed, dyslipidemia, hypertension.  Patient recently admitted at [facility] from 9/19 - 9/20 for melena.  She had a scope done earlier today in [facility] and was banded x 3.  While at [place] she was in alcohol withdrawal and was treated for it.  She also had urinary symptoms and urine cultures were growing E. coli, she was on ceftriaxone. She left AMA from [facility] drink alcohol and then presented to [facility] with persistent melena.    In the ED afebrile, hemodynamically stable.  Desaturates when sleeping but not when awake.  Labs show hemoglobin of 12, elevated LFTs.    I discussed the plan of care with patient, bedside RN, and edp .    Review of Systems  Review of Systems   Constitutional:  Negative for chills and fever.   Respiratory:  Negative for cough and shortness of breath.    Cardiovascular:  Negative for chest pain.   Gastrointestinal:  Positive for melena. Negative for diarrhea.   Genitourinary:  Negative for dysuria and urgency.       Past Medical History   has a past medical history of Acute pain, Arthritis, At risk for sleep apnea, Breath shortness, Dental disorder, Hepatitis C (1990), Hypertension, and Snoring.    Surgical History   has no past surgical history on file.     Family History  Family history reviewed with patient. There is no family history that is pertinent to the chief complaint.     Social History   reports that she has been smoking cigarettes. She started smoking about 37  years ago. She has a 18.9 pack-year smoking history. She does not have any smokeless tobacco history on file. She reports current alcohol use of about 1.2 oz of alcohol per week. She reports that she does not currently use drugs.    Allergies  Allergies   Allergen Reactions    Codeine Rash     rash       Medications  Prior to Admission Medications   Prescriptions Last Dose Informant Patient Reported? Taking?   atorvastatin (LIPITOR) 40 MG Tab   No No   Sig: Take 1 Tablet by mouth every evening.      Facility-Administered Medications: None       Physical Exam  Temp:  [36.8 °C (98.2 °F)] 36.8 °C (98.2 °F)  Pulse:  [100-112] 100  Resp:  [17-18] 18  BP: (100-130)/(60-79) 100/70  SpO2:  [90 %-96 %] 93 %  Blood Pressure: 109/60   Temperature: 36.8 °C (98.2 °F)   Pulse: 100   Respiration: 18   Pulse Oximetry: 96 %       Physical Exam  Constitutional:       Appearance: Normal appearance.   HENT:      Head: Normocephalic and atraumatic.      Mouth/Throat:      Mouth: Mucous membranes are moist.      Pharynx: No oropharyngeal exudate or posterior oropharyngeal erythema.   Cardiovascular:      Rate and Rhythm: Regular rhythm. Tachycardia present.      Pulses: Normal pulses.      Heart sounds: Normal heart sounds. No murmur heard.  Pulmonary:      Effort: Pulmonary effort is normal. No respiratory distress.      Breath sounds: Normal breath sounds.   Musculoskeletal:         General: No swelling or tenderness. Normal range of motion.   Skin:     General: Skin is warm and dry.   Neurological:      General: No focal deficit present.      Mental Status: She is alert and oriented to person, place, and time.   Psychiatric:         Mood and Affect: Mood normal.         Laboratory:  Recent Labs     09/19/24  0520 09/19/24  1155 09/20/24  0454 09/20/24  1114 09/20/24  2042   WBC  --   --   --   --  8.7   RBC 4.56  --  3.80*  --  4.22   HEMOGLOBIN 13.5   < > 11.5*  11.5* 12.6 12.4   HEMATOCRIT 39.5   < > 33.6*  33.6* 37.1 37.8   MCV  "86.4  --  88.3  --  89.6   MCH 29.6  --  30.4  --  29.4   MCHC 34.3  --  34.4  --  32.8   RDW 13.4  --  13.1  --  42.0   PLATELETCT 101*  --  85*  --  92*   MPV 10.1  --  9.8  --  11.9    < > = values in this interval not displayed.     Recent Labs     09/20/24 2042   SODIUM 136   POTASSIUM 3.9   CHLORIDE 102   CO2 23   GLUCOSE 101*   BUN 13   CREATININE 0.45*   CALCIUM 8.6     Recent Labs     09/20/24 2042   ALTSGPT 46   ASTSGOT 95*   ALKPHOSPHAT 179*   TBILIRUBIN 2.0*   LIPASE 51   GLUCOSE 101*     Recent Labs     09/20/24 2042   APTT 31.6   INR 1.31*     No results for input(s): \"NTPROBNP\" in the last 72 hours.      No results for input(s): \"TROPONINT\" in the last 72 hours.    Imaging:  DX-CHEST-PORTABLE (1 VIEW)   Final Result      No definite acute cardiac or pulmonary abnormalities are identified.        Assessment/Plan:  Justification for Admission Status  I anticipate this patient will require at least two midnights for appropriate medical management, necessitating inpatient admission because melena    * Esophageal varices with bleeding in diseases classified elsewhere (HCC)- (present on admission)  Assessment & Plan  Patient was at [facility] for melena, she had endoscopy and banded x 3  She left AMA to drink alcohol.  Comes back in with persistent melena  GI evaluated in the ED and will follow patient  Will continue with octreotide drip.  Needs close monitoring on telemetry for any signs of arrhythmia  H/H ordered Q8, transfuse if less than 7  IV ceftriaxone, IV PPI, n.p.o.    Alcohol withdrawal syndrome without complication (HCC)- (present on admission)  Assessment & Plan  Tachycardic in the ED somewhat tremulous.  Started on CIWA protocol  Thiamine, folate, multivitamin  Extensive cessation counseling provided    Right ovarian cyst- (present on admission)  Assessment & Plan  Seen on CT scan, increase in size per prior scans  Outpatient follow-up    Alcoholic cirrhosis of liver without ascites (HCC)- " (present on admission)  Assessment & Plan  Currently admitted for a variceal bleed  Counseled on alcohol cessation    Dyslipidemia- (present on admission)  Assessment & Plan  Continue statin    Tobacco abuse- (present on admission)  Assessment & Plan  Patient smokes 0.5 PPD.  Nicotine patch and/or gum ordered.   I discussed cessation with patient including starting on nicotine patch and/or gum on discharge.  I also discussed medications to help with cessation with patient including Wellbutrin and Chantix, said she is willing to try Chantix, outpatient follow-up.  Smoking cessation discussed with patient for 4 minutes.     Methamphetamine abuse (HCC)- (present on admission)  Assessment & Plan  Says she last used 4 days ago.  Counseled on cessation    Acute cystitis without hematuria- (present on admission)  Assessment & Plan  Cultures in [facility] per notes are growing E. coli.  Started on ceftriaxone      Patient is critically ill.   The patient continues to have: Alcoholic cirrhosis with an esophageal bleed with known varices.  Alcohol withdrawal  If untreated there is a high chance of deterioration And eventually death.   The critical care that I am providing today is: As above  The critical that has been undertaken is medically complex.   There has been no overlap in critical care time.   Critical Care Time not including procedures: 51 minutes      VTE prophylaxis: pharmacologic prophylaxis contraindicated due to melenamelena

## 2024-09-21 NOTE — ED NOTES
Checked on bed, patient with unlabored respirations. Vital signs is stable.   Denied any new complaints. No current needs identified.  Gurney in low position, side rail up for pt safety. Call light within reach.

## 2024-09-21 NOTE — ED NOTES
Patient transferred to the floor accompanied by floor RN, patient AAO X4, respirations even and unlabored on 2 liters O2 via nasal canula. Patient in possession on personal belongings.

## 2024-09-21 NOTE — PROGRESS NOTES
"At approximately 0315, RN heard room land-line phone hit the ground and then bed alarm went off- RN responded to pt room. Pt  sitting on side of bed with O2 off and asked RN for soup- RN placed nasal canula back on pt and educated that the doctors have ordered her NPO, so she can not have soup right now. Pt then asked for jello- RN reiterated that she can't have anything to eat or drink right now besides with medications. Pt hastily got back into bed and stated to RN: \"Fine! Then just bring me more ice chips.\" RN attempted to inform pt again that she can not have anything to eat or drink, but pt raised voice at RN and said \"I just had ice chips. You're full of sh*t. Bring me a cup of ice!\" RN apologized and pt then began yelling \"Espinoza!\" Which is her 's name. RN told pt that her  is at home so she needs to please stop yelling for him and that RN can assist her to call him if she wants. Pt agreed, so RN dialed pt  number on room phone and handed it to pt. Pt spoke to  and then threw phone back on ground. Pt then asked to used the restroom- RN assisted her to restroom and back to bed. RN educated on call-light use but pt does not have desire/motivation to be educated at this time. Will reinforce call light and safety/fall risk education every time RN is in the room.  Bed alarm on and audible. RN sitting outside pt room.  "

## 2024-09-21 NOTE — PROGRESS NOTES
4 Eyes Skin Assessment Completed by Darby REED RN and TONI Cook.    Head WDL  Ears WDL  Nose WDL  Mouth WDL  Neck WDL  Breast/Chest WDL  Shoulder Blades WDL  Spine WDL  (R) Arm/Elbow/Hand WDL  (L) Arm/Elbow/Hand Redness and Blanching  Abdomen Redness - from ecg electrode stickers  Groin WDL  Scrotum/Coccyx/Buttocks WDL  (R) Leg Scab  (L) Leg Scab  (R) Heel/Foot/Toe WDL  (L) Heel/Foot/Toe Scab- bottom of foot            Devices In Places Tele Box and Nasal Cannula      Interventions In Place Gray Ear Foams and Pillows    Possible Skin Injury No    Pictures Uploaded Into Epic N/A  Wound Consult Placed N/A  RN Wound Prevention Protocol Ordered No

## 2024-09-21 NOTE — CARE PLAN
The patient is Stable - Low risk of patient condition declining or worsening    Shift Goals  Clinical Goals: rest, safety, CIWA  Patient Goals: sleep    Progress made toward(s) clinical / shift goals:    Problem: Optimal Care for Alcohol Withdrawal  Goal: Optimal Care for the alcohol withdrawal patient  Outcome: Progressing  Note: Pt on CIWA protocol- pt voiced wanting to quit drinking, so CM consult placed per protocol     Problem: Fall Risk  Goal: Patient will remain free from falls  Outcome: Progressing  Note: Pt free from falls this shift. Safety measures in place including bed alarm. Pt education reinforced on fall risk and preventive measures often       Patient is not progressing towards the following goals:      Problem: Knowledge Deficit - Standard  Goal: Patient and family/care givers will demonstrate understanding of plan of care, disease process/condition, diagnostic tests and medications  Outcome: Not Progressing  Note: Pt lacks desire to be educated by RN. Pt needs reinforcement for all education

## 2024-09-22 NOTE — PROGRESS NOTES
Discharge Instructions    Discharged to home by car with relative. Discharged via wheelchair, hospital escort: Yes.  Special equipment needed: Not Applicable    Be sure to schedule a follow-up appointment with your primary care doctor or any specialists as instructed.     Discharge Plan:   Diet Plan: Discussed  Activity Level: Discussed  Confirmed Follow up Appointment: Patient to Call and Schedule Appointment  Confirmed Symptoms Management: Discussed  Medication Reconciliation Updated: Yes    I understand that a diet low in cholesterol, fat, and sodium is recommended for good health. Unless I have been given specific instructions below for another diet, I accept this instruction as my diet prescription.   Other diet: Full liquids, transition as tolerated.    Special Instructions: None    -Is this patient being discharged with medication to prevent blood clots?  No    Is patient discharged on Warfarin / Coumadin?   No       Patient discharged home with . She is alert and oriented x4 and on room air. Discharge instructions reviewed with patient and  at bedside, all questions answered accordingly. Strongly encouraged to avoid alcoholic beverages and reviewed GI bleed signs and symptoms. Pt to  prescriptions at pharmacy.  PIV X2 removed. All belongings with patient at time of discharge.

## 2024-09-24 LAB
HCV RNA SERPL NAA+PROBE-ACNC: ABNORMAL IU/ML
HCV RNA SERPL NAA+PROBE-LOG IU: 6.1 LOG IU/ML
HCV RNA SERPL QL NAA+PROBE: DETECTED